# Patient Record
Sex: FEMALE | Race: OTHER | HISPANIC OR LATINO | URBAN - METROPOLITAN AREA
[De-identification: names, ages, dates, MRNs, and addresses within clinical notes are randomized per-mention and may not be internally consistent; named-entity substitution may affect disease eponyms.]

---

## 2020-10-04 ENCOUNTER — EMERGENCY (EMERGENCY)
Facility: HOSPITAL | Age: 55
LOS: 1 days | Discharge: HOME | End: 2020-10-04
Attending: EMERGENCY MEDICINE
Payer: MEDICAID

## 2020-10-04 PROCEDURE — 99284 EMERGENCY DEPT VISIT MOD MDM: CPT

## 2020-10-05 VITALS
TEMPERATURE: 98 F | OXYGEN SATURATION: 100 % | HEART RATE: 54 BPM | RESPIRATION RATE: 19 BRPM | DIASTOLIC BLOOD PRESSURE: 72 MMHG | SYSTOLIC BLOOD PRESSURE: 136 MMHG

## 2020-10-05 VITALS
DIASTOLIC BLOOD PRESSURE: 82 MMHG | RESPIRATION RATE: 18 BRPM | HEART RATE: 61 BPM | SYSTOLIC BLOOD PRESSURE: 125 MMHG | TEMPERATURE: 98 F | OXYGEN SATURATION: 99 %

## 2020-10-05 LAB
ALBUMIN SERPL ELPH-MCNC: 3.6 G/DL — SIGNIFICANT CHANGE UP (ref 3.5–5)
ALP SERPL-CCNC: 129 U/L — HIGH (ref 40–120)
ALT FLD-CCNC: 43 U/L DA — SIGNIFICANT CHANGE UP (ref 10–60)
ANION GAP SERPL CALC-SCNC: 6 MMOL/L — SIGNIFICANT CHANGE UP (ref 5–17)
APPEARANCE UR: CLEAR — SIGNIFICANT CHANGE UP
AST SERPL-CCNC: 29 U/L — SIGNIFICANT CHANGE UP (ref 10–40)
BASOPHILS # BLD AUTO: 0.02 K/UL — SIGNIFICANT CHANGE UP (ref 0–0.2)
BASOPHILS NFR BLD AUTO: 0.3 % — SIGNIFICANT CHANGE UP (ref 0–2)
BILIRUB SERPL-MCNC: 0.5 MG/DL — SIGNIFICANT CHANGE UP (ref 0.2–1.2)
BILIRUB UR-MCNC: NEGATIVE — SIGNIFICANT CHANGE UP
BUN SERPL-MCNC: 12 MG/DL — SIGNIFICANT CHANGE UP (ref 7–18)
CALCIUM SERPL-MCNC: 9 MG/DL — SIGNIFICANT CHANGE UP (ref 8.4–10.5)
CHLORIDE SERPL-SCNC: 107 MMOL/L — SIGNIFICANT CHANGE UP (ref 96–108)
CO2 SERPL-SCNC: 26 MMOL/L — SIGNIFICANT CHANGE UP (ref 22–31)
COLOR SPEC: YELLOW — SIGNIFICANT CHANGE UP
CREAT SERPL-MCNC: 0.74 MG/DL — SIGNIFICANT CHANGE UP (ref 0.5–1.3)
DIFF PNL FLD: ABNORMAL
EOSINOPHIL # BLD AUTO: 0.01 K/UL — SIGNIFICANT CHANGE UP (ref 0–0.5)
EOSINOPHIL NFR BLD AUTO: 0.2 % — SIGNIFICANT CHANGE UP (ref 0–6)
GLUCOSE SERPL-MCNC: 117 MG/DL — HIGH (ref 70–99)
GLUCOSE UR QL: NEGATIVE — SIGNIFICANT CHANGE UP
HCG SERPL-ACNC: 4 MIU/ML — SIGNIFICANT CHANGE UP
HCT VFR BLD CALC: 40.3 % — SIGNIFICANT CHANGE UP (ref 34.5–45)
HGB BLD-MCNC: 14.4 G/DL — SIGNIFICANT CHANGE UP (ref 11.5–15.5)
IMM GRANULOCYTES NFR BLD AUTO: 0.3 % — SIGNIFICANT CHANGE UP (ref 0–1.5)
KETONES UR-MCNC: ABNORMAL
LEUKOCYTE ESTERASE UR-ACNC: ABNORMAL
LIDOCAIN IGE QN: 62 U/L — LOW (ref 73–393)
LYMPHOCYTES # BLD AUTO: 0.78 K/UL — LOW (ref 1–3.3)
LYMPHOCYTES # BLD AUTO: 11.7 % — LOW (ref 13–44)
MCHC RBC-ENTMCNC: 32.8 PG — SIGNIFICANT CHANGE UP (ref 27–34)
MCHC RBC-ENTMCNC: 35.7 GM/DL — SIGNIFICANT CHANGE UP (ref 32–36)
MCV RBC AUTO: 91.8 FL — SIGNIFICANT CHANGE UP (ref 80–100)
MONOCYTES # BLD AUTO: 0.29 K/UL — SIGNIFICANT CHANGE UP (ref 0–0.9)
MONOCYTES NFR BLD AUTO: 4.4 % — SIGNIFICANT CHANGE UP (ref 2–14)
NEUTROPHILS # BLD AUTO: 5.52 K/UL — SIGNIFICANT CHANGE UP (ref 1.8–7.4)
NEUTROPHILS NFR BLD AUTO: 83.1 % — HIGH (ref 43–77)
NITRITE UR-MCNC: NEGATIVE — SIGNIFICANT CHANGE UP
NRBC # BLD: 0 /100 WBCS — SIGNIFICANT CHANGE UP (ref 0–0)
PH UR: 8 — SIGNIFICANT CHANGE UP (ref 5–8)
PLATELET # BLD AUTO: 209 K/UL — SIGNIFICANT CHANGE UP (ref 150–400)
POTASSIUM SERPL-MCNC: 3.9 MMOL/L — SIGNIFICANT CHANGE UP (ref 3.5–5.3)
POTASSIUM SERPL-SCNC: 3.9 MMOL/L — SIGNIFICANT CHANGE UP (ref 3.5–5.3)
PROT SERPL-MCNC: 7.3 G/DL — SIGNIFICANT CHANGE UP (ref 6–8.3)
PROT UR-MCNC: 15
RBC # BLD: 4.39 M/UL — SIGNIFICANT CHANGE UP (ref 3.8–5.2)
RBC # FLD: 12.1 % — SIGNIFICANT CHANGE UP (ref 10.3–14.5)
SARS-COV-2 RNA SPEC QL NAA+PROBE: SIGNIFICANT CHANGE UP
SODIUM SERPL-SCNC: 139 MMOL/L — SIGNIFICANT CHANGE UP (ref 135–145)
SP GR SPEC: 1.01 — SIGNIFICANT CHANGE UP (ref 1.01–1.02)
TROPONIN I SERPL-MCNC: <0.015 NG/ML — SIGNIFICANT CHANGE UP (ref 0–0.04)
UROBILINOGEN FLD QL: NEGATIVE — SIGNIFICANT CHANGE UP
WBC # BLD: 6.64 K/UL — SIGNIFICANT CHANGE UP (ref 3.8–10.5)
WBC # FLD AUTO: 6.64 K/UL — SIGNIFICANT CHANGE UP (ref 3.8–10.5)

## 2020-10-05 PROCEDURE — 80053 COMPREHEN METABOLIC PANEL: CPT

## 2020-10-05 PROCEDURE — 71045 X-RAY EXAM CHEST 1 VIEW: CPT

## 2020-10-05 PROCEDURE — 81001 URINALYSIS AUTO W/SCOPE: CPT

## 2020-10-05 PROCEDURE — 99284 EMERGENCY DEPT VISIT MOD MDM: CPT | Mod: 25

## 2020-10-05 PROCEDURE — 84484 ASSAY OF TROPONIN QUANT: CPT

## 2020-10-05 PROCEDURE — 84702 CHORIONIC GONADOTROPIN TEST: CPT

## 2020-10-05 PROCEDURE — 96374 THER/PROPH/DIAG INJ IV PUSH: CPT

## 2020-10-05 PROCEDURE — 36415 COLL VENOUS BLD VENIPUNCTURE: CPT

## 2020-10-05 PROCEDURE — 85025 COMPLETE CBC W/AUTO DIFF WBC: CPT

## 2020-10-05 PROCEDURE — 93005 ELECTROCARDIOGRAM TRACING: CPT

## 2020-10-05 PROCEDURE — 71045 X-RAY EXAM CHEST 1 VIEW: CPT | Mod: 26

## 2020-10-05 PROCEDURE — 87635 SARS-COV-2 COVID-19 AMP PRB: CPT

## 2020-10-05 PROCEDURE — 70450 CT HEAD/BRAIN W/O DYE: CPT | Mod: 26

## 2020-10-05 PROCEDURE — 70450 CT HEAD/BRAIN W/O DYE: CPT

## 2020-10-05 PROCEDURE — 83690 ASSAY OF LIPASE: CPT

## 2020-10-05 RX ORDER — SODIUM CHLORIDE 9 MG/ML
1000 INJECTION INTRAMUSCULAR; INTRAVENOUS; SUBCUTANEOUS ONCE
Refills: 0 | Status: COMPLETED | OUTPATIENT
Start: 2020-10-05 | End: 2020-10-05

## 2020-10-05 RX ORDER — ONDANSETRON 8 MG/1
4 TABLET, FILM COATED ORAL ONCE
Refills: 0 | Status: COMPLETED | OUTPATIENT
Start: 2020-10-05 | End: 2020-10-05

## 2020-10-05 RX ORDER — MECLIZINE HCL 12.5 MG
1 TABLET ORAL
Qty: 12 | Refills: 0
Start: 2020-10-05

## 2020-10-05 RX ADMIN — ONDANSETRON 4 MILLIGRAM(S): 8 TABLET, FILM COATED ORAL at 02:20

## 2020-10-05 RX ADMIN — SODIUM CHLORIDE 1000 MILLILITER(S): 9 INJECTION INTRAMUSCULAR; INTRAVENOUS; SUBCUTANEOUS at 02:20

## 2020-10-05 NOTE — ED PROVIDER NOTE - PATIENT PORTAL LINK FT
You can access the FollowMyHealth Patient Portal offered by [facility] by registering at the following website: [email or web address]. By joining [facility] FollowMyHealth portal, you will also be able to view your health information using other applications (apps) compatible with our system.

## 2020-10-05 NOTE — ED PROVIDER NOTE - NSFOLLOWUPCLINICS_GEN_ALL_ED_FT
[facility]  Internal Medicine  [address] [place] [address]  Phone: [phone]  Fax: [phone]  Follow Up Time:

## 2020-10-05 NOTE — ED PROVIDER NOTE - OBJECTIVE STATEMENT
Chief complaint of N/ V and dizziness described as room spinning starting at 3pm yesterday. On evaluation pt feels better after IVF and Zofran. Denies motor weakness, no LOC.   Denies recent outside [place] travels, sick contacts or known spoiled food consumption.   PMHX none  Pshx none  meds none.   Kernig and Brudzinski signs area negative, no petechiae, no photophobia, no dysarthria, no facial asymmetry, no focal deficits.  NIH stroke scale is zero. Pt passed dysphagia screen.   no ataxia, no nystagmus. no tinnitus. Normal gait. Shane henriquez pike neg (no reporducible vertigo with position changes). Chief complaint of N/V and dizziness described as room spinning starting at 3pm yesterday. On evaluation pt feels better after IVF and Zofran. Denies motor weakness, no LOC.   Denies recent outside [place] travels, sick contacts or known spoiled food consumption.   PMHX none  Pshx none  meds none.

## 2020-10-05 NOTE — ED PROVIDER NOTE - CLINICAL SUMMARY MEDICAL DECISION MAKING FREE TEXT BOX
CT reported no IC pathology. Pt feels better, no focal deficits. Pt adamantly requesting discharge.   Pt is well appearing, has no new complaints and able to walk with normal gait. Pt is stable for discharge and follow up with medical doctor. Pt educated on care and need for follow up. Discussed anticipatory guidance and return precautions. Questions answered. I had a detailed discussion with the patient and/or guardian regarding the historical points, exam findings, and any diagnostic results supporting the discharge diagnosis.

## 2020-10-05 NOTE — ED PROVIDER NOTE - CHPI ED SYMPTOMS NEG
no vomiting/no blurred vision/no confusion/no dizziness/no fever/no change in level of consciousness/no loss of consciousness/no nausea/no numbness/no weakness

## 2020-10-05 NOTE — ED PROVIDER NOTE - NSFOLLOWUPINSTRUCTIONS_ED_ALL_ED_FT
Return if dizziness worsens, weakness, vomiting, chest pain, shortness of breath any concerns.  See your doctor as soon as possible (within 1-2 days).   If you need further assistance for appointments you can contact the [facility] Care Coordinator at [phone] or the [facility] Patient Access Services helpline at [phone] to find names/contact #s for a practitioner to follow up with.  Bring your discharge papers / test results with you to any follow up appointments.   In addition our outpatient [facility] is located at [address] [place] [address], tel: [phone].

## 2020-10-05 NOTE — ED ADULT NURSE NOTE - NSIMPLEMENTINTERV_GEN_ALL_ED
Implemented All Universal Safety Interventions:  [place] to call system. Call bell, personal items and telephone within reach. Instruct patient to call for assistance. Room bathroom lighting operational. Non-slip footwear when patient is off stretcher. Physically safe environment: no spills, clutter or unnecessary equipment. Stretcher in lowest position, wheels locked, appropriate side rails in place.

## 2020-12-07 NOTE — ED PROVIDER NOTE - PHYSICAL EXAMINATION
Called and left message reminding pt to have fasting labs done. To call office with any questions. Kernig and Brudzinski signs area negative, no petechiae, no photophobia, no dysarthria, no facial asymmetry, no focal deficits.  NIH stroke scale is zero. Pt passed dysphagia screen.   no ataxia, no nystagmus. no tinnitus. Normal gait. Shane henriquez pike: mild reproducible vertigo with upright to supine.

## 2022-04-01 PROBLEM — Z00.00 ENCOUNTER FOR PREVENTIVE HEALTH EXAMINATION: Status: ACTIVE | Noted: 2022-04-01

## 2022-04-07 ENCOUNTER — APPOINTMENT (OUTPATIENT)
Dept: PLASTIC SURGERY | Facility: CLINIC | Age: 57
End: 2022-04-07
Payer: MEDICAID

## 2022-04-07 VITALS
BODY MASS INDEX: 32.44 KG/M2 | DIASTOLIC BLOOD PRESSURE: 78 MMHG | OXYGEN SATURATION: 98 % | SYSTOLIC BLOOD PRESSURE: 114 MMHG | TEMPERATURE: 97.6 F | HEART RATE: 66 BPM | WEIGHT: 190 LBS | HEIGHT: 64 IN

## 2022-04-07 DIAGNOSIS — Z78.9 OTHER SPECIFIED HEALTH STATUS: ICD-10-CM

## 2022-04-07 PROCEDURE — 99204 OFFICE O/P NEW MOD 45 MIN: CPT

## 2022-04-07 NOTE — REASON FOR VISIT
[Consultation] : a consultation visit [Family Member] : family member [FreeTextEntry1] : Dr. Estee Wheatley (General Surgery)

## 2022-04-07 NOTE — CONSULT LETTER
[Dear  ___] : Dear  [unfilled], [Consult Letter:] : I had the pleasure of evaluating your patient, [unfilled]. [Please see my note below.] : Please see my note below. [Consult Closing:] : Thank you very much for allowing me to participate in the care of this patient.  If you have any questions, please do not hesitate to contact me. [Sincerely,] : Sincerely, [FreeTextEntry3] : Jenaro Montes MD, FACS

## 2022-04-16 ENCOUNTER — APPOINTMENT (OUTPATIENT)
Dept: CT IMAGING | Facility: IMAGING CENTER | Age: 57
End: 2022-04-16
Payer: MEDICAID

## 2022-04-16 ENCOUNTER — OUTPATIENT (OUTPATIENT)
Dept: OUTPATIENT SERVICES | Facility: HOSPITAL | Age: 57
LOS: 1 days | End: 2022-04-16
Payer: MEDICAID

## 2022-04-16 DIAGNOSIS — C50.912 MALIGNANT NEOPLASM OF UNSPECIFIED SITE OF LEFT FEMALE BREAST: ICD-10-CM

## 2022-04-16 PROCEDURE — 74174 CTA ABD&PLVS W/CONTRAST: CPT | Mod: 26

## 2022-04-16 PROCEDURE — 74174 CTA ABD&PLVS W/CONTRAST: CPT

## 2022-04-16 PROCEDURE — 82565 ASSAY OF CREATININE: CPT

## 2022-05-03 ENCOUNTER — OUTPATIENT (OUTPATIENT)
Dept: OUTPATIENT SERVICES | Facility: HOSPITAL | Age: 57
LOS: 1 days | End: 2022-05-03
Payer: MEDICAID

## 2022-05-03 ENCOUNTER — RESULT REVIEW (OUTPATIENT)
Age: 57
End: 2022-05-03

## 2022-05-03 VITALS
WEIGHT: 188.72 LBS | SYSTOLIC BLOOD PRESSURE: 156 MMHG | RESPIRATION RATE: 18 BRPM | DIASTOLIC BLOOD PRESSURE: 91 MMHG | OXYGEN SATURATION: 100 % | HEART RATE: 66 BPM | HEIGHT: 62 IN | TEMPERATURE: 98 F

## 2022-05-03 DIAGNOSIS — C50.912 MALIGNANT NEOPLASM OF UNSPECIFIED SITE OF LEFT FEMALE BREAST: ICD-10-CM

## 2022-05-03 DIAGNOSIS — Z98.51 TUBAL LIGATION STATUS: Chronic | ICD-10-CM

## 2022-05-03 DIAGNOSIS — Z98.890 OTHER SPECIFIED POSTPROCEDURAL STATES: Chronic | ICD-10-CM

## 2022-05-03 DIAGNOSIS — R92.8 OTHER ABNORMAL AND INCONCLUSIVE FINDINGS ON DIAGNOSTIC IMAGING OF BREAST: ICD-10-CM

## 2022-05-03 DIAGNOSIS — C50.812 MALIGNANT NEOPLASM OF OVERLAPPING SITES OF LEFT FEMALE BREAST: ICD-10-CM

## 2022-05-03 LAB
ANION GAP SERPL CALC-SCNC: 6 MMOL/L — SIGNIFICANT CHANGE UP (ref 5–17)
BLD GP AB SCN SERPL QL: SIGNIFICANT CHANGE UP
BUN SERPL-MCNC: 13 MG/DL — SIGNIFICANT CHANGE UP (ref 7–23)
CALCIUM SERPL-MCNC: 9.5 MG/DL — SIGNIFICANT CHANGE UP (ref 8.4–10.5)
CHLORIDE SERPL-SCNC: 105 MMOL/L — SIGNIFICANT CHANGE UP (ref 96–108)
CO2 SERPL-SCNC: 30 MMOL/L — SIGNIFICANT CHANGE UP (ref 22–31)
CREAT SERPL-MCNC: 0.79 MG/DL — SIGNIFICANT CHANGE UP (ref 0.5–1.3)
EGFR: 87 ML/MIN/1.73M2 — SIGNIFICANT CHANGE UP
GLUCOSE SERPL-MCNC: 97 MG/DL — SIGNIFICANT CHANGE UP (ref 70–99)
HCG SERPL-ACNC: 4 MIU/ML — SIGNIFICANT CHANGE UP
HCT VFR BLD CALC: 42.4 % — SIGNIFICANT CHANGE UP (ref 34.5–45)
HGB BLD-MCNC: 14.6 G/DL — SIGNIFICANT CHANGE UP (ref 11.5–15.5)
MCHC RBC-ENTMCNC: 32.9 PG — SIGNIFICANT CHANGE UP (ref 27–34)
MCHC RBC-ENTMCNC: 34.4 GM/DL — SIGNIFICANT CHANGE UP (ref 32–36)
MCV RBC AUTO: 95.5 FL — SIGNIFICANT CHANGE UP (ref 80–100)
NRBC # BLD: 0 /100 WBCS — SIGNIFICANT CHANGE UP (ref 0–0)
PLATELET # BLD AUTO: 218 K/UL — SIGNIFICANT CHANGE UP (ref 150–400)
POTASSIUM SERPL-MCNC: 3.8 MMOL/L — SIGNIFICANT CHANGE UP (ref 3.5–5.3)
POTASSIUM SERPL-SCNC: 3.8 MMOL/L — SIGNIFICANT CHANGE UP (ref 3.5–5.3)
RBC # BLD: 4.44 M/UL — SIGNIFICANT CHANGE UP (ref 3.8–5.2)
RBC # FLD: 12.5 % — SIGNIFICANT CHANGE UP (ref 10.3–14.5)
SODIUM SERPL-SCNC: 141 MMOL/L — SIGNIFICANT CHANGE UP (ref 135–145)
WBC # BLD: 5.21 K/UL — SIGNIFICANT CHANGE UP (ref 3.8–10.5)
WBC # FLD AUTO: 5.21 K/UL — SIGNIFICANT CHANGE UP (ref 3.8–10.5)

## 2022-05-03 PROCEDURE — G0463: CPT

## 2022-05-03 PROCEDURE — 80048 BASIC METABOLIC PNL TOTAL CA: CPT

## 2022-05-03 PROCEDURE — 86850 RBC ANTIBODY SCREEN: CPT

## 2022-05-03 PROCEDURE — 86900 BLOOD TYPING SEROLOGIC ABO: CPT

## 2022-05-03 PROCEDURE — 85027 COMPLETE CBC AUTOMATED: CPT

## 2022-05-03 PROCEDURE — 36415 COLL VENOUS BLD VENIPUNCTURE: CPT

## 2022-05-03 PROCEDURE — 88321 CONSLTJ&REPRT SLD PREP ELSWR: CPT

## 2022-05-03 PROCEDURE — 93005 ELECTROCARDIOGRAM TRACING: CPT

## 2022-05-03 PROCEDURE — 93010 ELECTROCARDIOGRAM REPORT: CPT | Mod: NC

## 2022-05-03 PROCEDURE — 71046 X-RAY EXAM CHEST 2 VIEWS: CPT

## 2022-05-03 PROCEDURE — 86901 BLOOD TYPING SEROLOGIC RH(D): CPT

## 2022-05-03 PROCEDURE — 71046 X-RAY EXAM CHEST 2 VIEWS: CPT | Mod: 26

## 2022-05-03 PROCEDURE — 84702 CHORIONIC GONADOTROPIN TEST: CPT

## 2022-05-03 NOTE — H&P PST ADULT - NSICDXFAMILYHX_GEN_ALL_CORE_FT
FAMILY HISTORY:  Mother  Still living? Unknown  FH: liver cancer, Age at diagnosis: Age Unknown  FH: type 2 diabetes, Age at diagnosis: Age Unknown

## 2022-05-03 NOTE — H&P PST ADULT - NSANTHOSAYNRD_GEN_A_CORE
neck 14.5 inches/No. YESI screening performed.  STOP BANG Legend: 0-2 = LOW Risk; 3-4 = INTERMEDIATE Risk; 5-8 = HIGH Risk

## 2022-05-03 NOTE — H&P PST ADULT - HISTORY OF PRESENT ILLNESS
55 y/o Greek speaking female with PMH of vertigo presents for PST.  Patient reprots left breast clear nipple discharge resulting in work-up including mammogram and biopsy and dx with breast cancer.  Denies any recent cough fever or illness and is feeling well today at [facility].  Scheduled for bilateral simple mastectomy, left with sentinel node injection in OR, bilateral breast reconstruction with abdominal based flap (JIAN ) reconstruction with Dr Lezama on 05/09/2022.  COVID-19 pcr TBS- information provided

## 2022-05-03 NOTE — H&P PST ADULT - NSICDXPASTMEDICALHX_GEN_ALL_CORE_FT
PAST MEDICAL HISTORY:  History of 2019 novel coronavirus disease (COVID-19) 3/2021 - not hospitalized but was treated with Abx therapy    Malignant neoplasm of unspecified site of left female breast

## 2022-05-03 NOTE — H&P PST ADULT - NEGATIVE ENMT SYMPTOMS
no hearing difficulty/no ear pain/no nasal congestion/no nasal discharge/no nasal obstruction/no post-nasal discharge/no nose bleeds/no throat pain/no dysphagia

## 2022-05-03 NOTE — H&P PST ADULT - PROBLEM SELECTOR PLAN 1
Scheduled for bilateral simple mastectomy, left with sentinel node injection in OR, bilateral breast reconstruction with abdominal based flap (JIAN) reconstruction with Dr Lezama on 05/09/2022.  Pre op instructions given and patient verbalized understanding.  CBC, BMP, EKG and T&S pending.  NPO after midnight night before procedure.  To stop all ASA, NSAIDs, vitamins and supplements 1 week prior to procedure.  Chlorhexidine wash given with instructions.  COVID-19 testing TBS - information provided.  All instructions and information given using  phone

## 2022-05-03 NOTE — H&P PST ADULT - FALL HARM RISK - UNIVERSAL INTERVENTIONS
Bed in lowest position, wheels locked, appropriate side rails in place/Call bell, personal items and telephone in reach/Instruct patient to call for assistance before getting out of bed or chair/Non-slip footwear when patient is out of bed/[place] to call system/Physically safe environment - no spills, clutter or unnecessary equipment/Purposeful Proactive Rounding/Room/bathroom lighting operational, light cord in reach

## 2022-05-03 NOTE — H&P PST ADULT - ENDOCRINE
[Use of Plain Language] : use of plain language [Adequate] : adequate [] : I have reviewed management goals with caretaker and provided a copy of care plan [None] : none negative

## 2022-05-05 LAB — SURGICAL PATHOLOGY STUDY: SIGNIFICANT CHANGE UP

## 2022-05-07 PROBLEM — Z86.16 PERSONAL HISTORY OF COVID-19: Chronic | Status: ACTIVE | Noted: 2022-05-03

## 2022-05-08 ENCOUNTER — TRANSCRIPTION ENCOUNTER (OUTPATIENT)
Age: 57
End: 2022-05-08

## 2022-05-09 ENCOUNTER — RESULT REVIEW (OUTPATIENT)
Age: 57
End: 2022-05-09

## 2022-05-09 ENCOUNTER — APPOINTMENT (OUTPATIENT)
Dept: PLASTIC SURGERY | Facility: HOSPITAL | Age: 57
End: 2022-05-09
Payer: MEDICAID

## 2022-05-09 ENCOUNTER — TRANSCRIPTION ENCOUNTER (OUTPATIENT)
Age: 57
End: 2022-05-09

## 2022-05-09 ENCOUNTER — INPATIENT (INPATIENT)
Facility: HOSPITAL | Age: 57
LOS: 3 days | Discharge: HOME | DRG: 581 | End: 2022-05-13
Attending: SURGERY | Admitting: SURGERY
Payer: MEDICAID

## 2022-05-09 VITALS
SYSTOLIC BLOOD PRESSURE: 146 MMHG | HEIGHT: 62 IN | OXYGEN SATURATION: 99 % | RESPIRATION RATE: 14 BRPM | DIASTOLIC BLOOD PRESSURE: 89 MMHG | TEMPERATURE: 98 F | HEART RATE: 68 BPM | WEIGHT: 188.72 LBS

## 2022-05-09 DIAGNOSIS — R92.8 OTHER ABNORMAL AND INCONCLUSIVE FINDINGS ON DIAGNOSTIC IMAGING OF BREAST: ICD-10-CM

## 2022-05-09 DIAGNOSIS — Z98.51 TUBAL LIGATION STATUS: Chronic | ICD-10-CM

## 2022-05-09 DIAGNOSIS — Z98.890 OTHER SPECIFIED POSTPROCEDURAL STATES: Chronic | ICD-10-CM

## 2022-05-09 PROCEDURE — 88305 TISSUE EXAM BY PATHOLOGIST: CPT | Mod: 26

## 2022-05-09 PROCEDURE — 21600 PARTIAL REMOVAL OF RIB: CPT | Mod: 59,LT

## 2022-05-09 PROCEDURE — 88307 TISSUE EXAM BY PATHOLOGIST: CPT | Mod: 26

## 2022-05-09 PROCEDURE — S2068: CPT | Mod: LT

## 2022-05-09 PROCEDURE — 38530 BIOPSY/REMOVAL LYMPH NODES: CPT | Mod: RT

## 2022-05-09 PROCEDURE — 88333 PATH CONSLTJ SURG CYTO XM 1: CPT | Mod: 26

## 2022-05-09 PROCEDURE — 38530 BIOPSY/REMOVAL LYMPH NODES: CPT | Mod: LT

## 2022-05-09 PROCEDURE — 49999 UNLISTED PX ABD PERTM&OMN: CPT

## 2022-05-09 PROCEDURE — 49999 UNLISTED PX ABD PERTM&OMN: CPT | Mod: 62

## 2022-05-09 PROCEDURE — S2068: CPT | Mod: RT

## 2022-05-09 PROCEDURE — 19303 MAST SIMPLE COMPLETE: CPT | Mod: AS

## 2022-05-09 PROCEDURE — 21600 PARTIAL REMOVAL OF RIB: CPT | Mod: RT,59

## 2022-05-09 DEVICE — CLIP APPLIER ETHICON LIGACLIP 11.5" MEDIUM: Type: IMPLANTABLE DEVICE | Site: BILATERAL | Status: FUNCTIONAL

## 2022-05-09 DEVICE — COUPLER VESSEL MICROVASC ANAST 2MM GRN: Type: IMPLANTABLE DEVICE | Site: BILATERAL | Status: FUNCTIONAL

## 2022-05-09 DEVICE — HEMOSTAT ARISTA 1GR: Type: IMPLANTABLE DEVICE | Site: BILATERAL | Status: FUNCTIONAL

## 2022-05-09 DEVICE — COUPLER VESSEL ANASTOMOTIC 3MM: Type: IMPLANTABLE DEVICE | Site: BILATERAL | Status: FUNCTIONAL

## 2022-05-09 DEVICE — HEMOCLIP MED BLUE 6 CARTRIDGE TITANIUM: Type: IMPLANTABLE DEVICE | Site: BILATERAL | Status: FUNCTIONAL

## 2022-05-09 DEVICE — CLIP APPLIER ETHICON LIGACLIP 9 3/8" SMALL: Type: IMPLANTABLE DEVICE | Site: BILATERAL | Status: FUNCTIONAL

## 2022-05-09 DEVICE — CARTRIDGE MICROCLIP 30: Type: IMPLANTABLE DEVICE | Site: BILATERAL | Status: FUNCTIONAL

## 2022-05-09 DEVICE — HEMOCLIP SM WIDE RED 24 CARTRIDGE TITANIUM: Type: IMPLANTABLE DEVICE | Site: BILATERAL | Status: FUNCTIONAL

## 2022-05-09 DEVICE — MESH PHASIX 6X8IN: Type: IMPLANTABLE DEVICE | Site: BILATERAL | Status: FUNCTIONAL

## 2022-05-09 RX ORDER — CEFAZOLIN SODIUM 1 G
2000 VIAL (EA) INJECTION EVERY 8 HOURS
Refills: 0 | Status: COMPLETED | OUTPATIENT
Start: 2022-05-09 | End: 2022-05-10

## 2022-05-09 RX ORDER — KETOROLAC TROMETHAMINE 30 MG/ML
30 SYRINGE (ML) INJECTION EVERY 6 HOURS
Refills: 0 | Status: DISCONTINUED | OUTPATIENT
Start: 2022-05-09 | End: 2022-05-12

## 2022-05-09 RX ORDER — SODIUM CHLORIDE 9 MG/ML
1000 INJECTION, SOLUTION INTRAVENOUS
Refills: 0 | Status: DISCONTINUED | OUTPATIENT
Start: 2022-05-09 | End: 2022-05-09

## 2022-05-09 RX ORDER — DIAZEPAM 5 MG
5 TABLET ORAL EVERY 8 HOURS
Refills: 0 | Status: DISCONTINUED | OUTPATIENT
Start: 2022-05-09 | End: 2022-05-13

## 2022-05-09 RX ORDER — SENNA PLUS 8.6 MG/1
2 TABLET ORAL AT BEDTIME
Refills: 0 | Status: DISCONTINUED | OUTPATIENT
Start: 2022-05-09 | End: 2022-05-13

## 2022-05-09 RX ORDER — ACETAMINOPHEN 500 MG
1000 TABLET ORAL ONCE
Refills: 0 | Status: COMPLETED | OUTPATIENT
Start: 2022-05-09 | End: 2022-05-09

## 2022-05-09 RX ORDER — HYDROMORPHONE HYDROCHLORIDE 2 MG/ML
0.5 INJECTION INTRAMUSCULAR; INTRAVENOUS; SUBCUTANEOUS EVERY 4 HOURS
Refills: 0 | Status: DISCONTINUED | OUTPATIENT
Start: 2022-05-09 | End: 2022-05-12

## 2022-05-09 RX ORDER — HYDROMORPHONE HYDROCHLORIDE 2 MG/ML
0.5 INJECTION INTRAMUSCULAR; INTRAVENOUS; SUBCUTANEOUS
Refills: 0 | Status: DISCONTINUED | OUTPATIENT
Start: 2022-05-09 | End: 2022-05-09

## 2022-05-09 RX ORDER — SODIUM CHLORIDE 9 MG/ML
1000 INJECTION, SOLUTION INTRAVENOUS
Refills: 0 | Status: DISCONTINUED | OUTPATIENT
Start: 2022-05-09 | End: 2022-05-11

## 2022-05-09 RX ORDER — ACETAMINOPHEN 500 MG
1000 TABLET ORAL EVERY 8 HOURS
Refills: 0 | Status: COMPLETED | OUTPATIENT
Start: 2022-05-09 | End: 2022-05-11

## 2022-05-09 RX ORDER — OXYCODONE HYDROCHLORIDE 5 MG/1
5 TABLET ORAL EVERY 4 HOURS
Refills: 0 | Status: DISCONTINUED | OUTPATIENT
Start: 2022-05-09 | End: 2022-05-13

## 2022-05-09 RX ORDER — OXYCODONE HYDROCHLORIDE 5 MG/1
10 TABLET ORAL EVERY 4 HOURS
Refills: 0 | Status: DISCONTINUED | OUTPATIENT
Start: 2022-05-09 | End: 2022-05-13

## 2022-05-09 RX ORDER — MECLIZINE HCL 12.5 MG
12.5 TABLET ORAL THREE TIMES A DAY
Refills: 0 | Status: DISCONTINUED | OUTPATIENT
Start: 2022-05-09 | End: 2022-05-09

## 2022-05-09 RX ORDER — ONDANSETRON 8 MG/1
4 TABLET, FILM COATED ORAL ONCE
Refills: 0 | Status: DISCONTINUED | OUTPATIENT
Start: 2022-05-09 | End: 2022-05-09

## 2022-05-09 RX ORDER — DIPHENHYDRAMINE HCL 50 MG
25 CAPSULE ORAL EVERY 4 HOURS
Refills: 0 | Status: DISCONTINUED | OUTPATIENT
Start: 2022-05-09 | End: 2022-05-13

## 2022-05-09 RX ORDER — ENOXAPARIN SODIUM 100 MG/ML
40 INJECTION SUBCUTANEOUS EVERY 24 HOURS
Refills: 0 | Status: DISCONTINUED | OUTPATIENT
Start: 2022-05-09 | End: 2022-05-13

## 2022-05-09 RX ADMIN — SENNA PLUS 2 TABLET(S): 8.6 TABLET ORAL at 23:04

## 2022-05-09 RX ADMIN — HYDROMORPHONE HYDROCHLORIDE 0.5 MILLIGRAM(S): 2 INJECTION INTRAMUSCULAR; INTRAVENOUS; SUBCUTANEOUS at 18:50

## 2022-05-09 RX ADMIN — Medication 30 MILLIGRAM(S): at 23:34

## 2022-05-09 RX ADMIN — OXYCODONE HYDROCHLORIDE 5 MILLIGRAM(S): 5 TABLET ORAL at 21:23

## 2022-05-09 RX ADMIN — Medication 30 MILLIGRAM(S): at 23:04

## 2022-05-09 RX ADMIN — Medication 1000 MILLIGRAM(S): at 18:07

## 2022-05-09 RX ADMIN — Medication 30 MILLIGRAM(S): at 18:04

## 2022-05-09 RX ADMIN — SODIUM CHLORIDE 50 MILLILITER(S): 9 INJECTION, SOLUTION INTRAVENOUS at 06:39

## 2022-05-09 RX ADMIN — Medication 100 MILLIGRAM(S): at 23:04

## 2022-05-09 RX ADMIN — SODIUM CHLORIDE 75 MILLILITER(S): 9 INJECTION, SOLUTION INTRAVENOUS at 21:23

## 2022-05-09 RX ADMIN — Medication 400 MILLIGRAM(S): at 17:45

## 2022-05-09 RX ADMIN — Medication 30 MILLIGRAM(S): at 18:49

## 2022-05-09 RX ADMIN — OXYCODONE HYDROCHLORIDE 5 MILLIGRAM(S): 5 TABLET ORAL at 21:53

## 2022-05-09 NOTE — BRIEF OPERATIVE NOTE - SPECIMENS
see Dr. Wheatley (breast sx) see Dr. Wheatley (breast sx), right & left internal mammary lymph nodes.

## 2022-05-09 NOTE — CONSULT NOTE ADULT - SUBJECTIVE AND OBJECTIVE BOX
HPI: 57 y/o F w/ pmh of vertigo, recently dx w/ breast ca now s/p b/l simple mastectomy, L. w/ sentinel node injection in OR, b/l breast reconstruction w/ abdominal JIAN flap. MICU consulted for admission for q1h JIAN flap monitor. Pt now POD#0, seen and examined at bedside currently comfortable and w/out any medical complains.    Review of Systems:  Constitutional: No fever, chills, fatigue  Neuro: No headache, numbness, weakness  Resp: No cough, wheezing, shortness of breath  CVS: No chest pain, palpitations, leg swelling  GI: No abdominal pain, nausea, vomiting, diarrhea   : No dysuria, frequency, incontinence  Skin: No itching, burning, rashes, or lesions   Msk: No joint pain or swelling  Psych: No depression, anxiety, mood swings    T(F): 97 (05-09-22 @ 19:06), Max: 97.6 (05-09-22 @ 19:00)  HR: 72 (05-09-22 @ 21:00) (62 - 86)  BP: 120/73 (05-09-22 @ 21:00) (115/73 - 146/89)  RR: 15 (05-09-22 @ 21:00) (12 - 16)  SpO2: 99% (05-09-22 @ 21:00) (97% - 100%)  Wt(kg): --      05-09-22 @ 07:01  -  05-09-22 @ 21:08  --------------------------------------------------------  IN: 375 mL / OUT: 431 mL / NET: -56 mL        CAPILLARY BLOOD GLUCOSE          I&O's Summary    09 May 2022 07:01  -  09 May 2022 21:08  --------------------------------------------------------  IN: 375 mL / OUT: 431 mL / NET: -56 mL        Physical Exam:   Gen: Comfortable in bed in NAD  Neuro: AAOx3  HEENT: PERRL  Resp: CTA b/l  CVS: +RRR  Chest: b/l breast w/ vioptix sensors in place, incision site c/d/i, flaps w/out any signs of ischemia, no signs of discoloration/hematoma, R. breast vioptix 58% w/ SQ of 95 and L. breast 70% w/ SQ of 96%  Abd: incision site c/d/i, abd soft, ND  Ext: no edema   Skin: warm/dry    Meds:  ceFAZolin   IVPB IV Intermittent          acetaminophen   IVPB .. IV Intermittent  diazepam    Tablet Oral PRN  diphenhydrAMINE Oral PRN  HYDROmorphone  Injectable IV Push PRN  ketorolac   Injectable IV Push  oxyCODONE    IR Oral PRN  oxyCODONE    IR Oral PRN      enoxaparin Injectable SubCutaneous    senna Oral      lactated ringers. IV Continuous      CENTRAL LINE: N  MOORE: Y  A-LINE: N    CODE STATUS: FULL CODE

## 2022-05-09 NOTE — BRIEF OPERATIVE NOTE - BRIEF OP NOTE DRAINS
6 in total:  4 15 blakes ( 2 in each breast)  2 19 blakes ( 2 in abdomen)
yes refer to Dr. Montes's op note

## 2022-05-09 NOTE — CONSULT NOTE ADULT - ASSESSMENT
55 y/o F w/ pmh of vertigo, recently dx w/ breast ca now s/p b/l simple mastectomy, L. w/ sentinel node injection in OR, b/l breast reconstruction w/ abdominal JIAN flap. MICU consulted for admission for q1h JIAN flap monitor. Pt now POD#0, seen and examined at bedside currently comfortable and w/out any medical complains.        -Neuro: No acute issues pain control w tylenol/toradol and oxycodone/dilaudid per pain scale (hold for sedation)  -Cardiac: HDS no acute issues, maintain MAP >65  -Resp: No acute issues, maintain o2 >92%  -GI: NPO for now, advance diet as tolerated  -Renal: renal function stable, maintain rdz tonight and monitor UOP, cont LR at 75cc/hr  -ID: Complete ppx courser of ancef  -Endo: no acute issues  -Heme: DVT ppx w/ lovenox  -Surgical: Breast ca now s/p b/l mastectomy w/ breast reconstruction and abdominal JIAN flap plan to monitor vioptix tonight closely, will notify surgical team for drops in vioptix >10 percent  -Dispo: Admit to Surgical unit under MICU service, pt is full code, dispo pending hospital course, plan d/w bedside RN and pt.

## 2022-05-09 NOTE — BRIEF OPERATIVE NOTE - NSICDXBRIEFPREOP_GEN_ALL_CORE_FT
PRE-OP DIAGNOSIS:  Breast cancer, left 09-May-2022 07:01:05  Rigo De La Vega  
PRE-OP DIAGNOSIS:  Breast cancer, left 09-May-2022 07:01:05  Rigo De La Vega

## 2022-05-09 NOTE — BRIEF OPERATIVE NOTE - NSICDXBRIEFPROCEDURE_GEN_ALL_CORE_FT
PROCEDURES:  Mastectomy, bilateral, with sentinel node biopsy 09-May-2022 11:27:35  Elissa Molina A

## 2022-05-09 NOTE — PATIENT PROFILE ADULT - NSPROPTRIGHTCAREGIVER_GEN_A_NUR
IRINEO Note: -S/O: The above pt has been out in the day area the a.am. Portion of the shift hyper-verbal, flight of ideas, responding to internal stimuli, mumbling in different voice tones, and being disruptive while out in the milieu the entire morning. She has been yelling racial remarks at staff she has verbalized \"You are just a nigga\", throwing water at the staff and also being very argument ike towards staff when was encouraged to take her medicine during this shift. She has been singing, rapping, and attempting to take her clothing off while out in the day sunita. She was re-directed for her behavior by staff. She also has been focused on hyper-sexual behavior while out in the day area and in her room this a.am. Portion of the shift. She was given some medication complaint (see nurse note) during this shift. She consumed her breakfast she eating her breakfast very childlike manner which consist of playing with her food eating with her hands and drinking the syrup and while out in the day area. She is currently resting in her bed at this time. She refused group during this shift. The above pt woke up and proceeded out in the day area she consumed her lunch during this shift. She appeared calm and cooperative and she was complaint with her medications during the afternoon portion of the shift. She participated in group this afternoon while group was in session she still appear to be responding to internal stimuli and laughing, mumbling and making facial gestures while in group this shift. She has not experienced any falls during this shift. She appear to be focused on men when out in the day area she has required re-direction for singing and dancing in front of the the men on this shift. She verbally contract for safety and denies any self-harm at this time. -A-Problem #1 cont. -P-Maintain a safe and supportive environment. yes

## 2022-05-09 NOTE — BRIEF OPERATIVE NOTE - IV INFUSIONS - CRYSTALLOIDS
ADVANCE PRACTICE EXAM & DAILY COMMUNICATION NOTE    Patient Active Problem List   Diagnosis     Premature infant     Malnutrition (H)      abstinence symptoms       VITALS:  Temp:  [98.2  F (36.8  C)-99  F (37.2  C)] 98.8  F (37.1  C)  Heart Rate:  [122-144] 128  Resp:  [34-55] 55  BP: (69-91)/(49-61) 91/53  Cuff Mean (mmHg):  [54-68] 65      PHYSICAL EXAM:  Constitutional: alert, crying, disorganized movements. HOB flat.  Head: Normocephalic. Anterior fontanelle soft, scalp clear.   Cardiovascular: Regular rate and rhythm.  No murmur.  Normal S1 & S2.  Peripheral/femoral pulses present, normal and symmetric. Extremities warm. Capillary refill <3 seconds peripherally and centrally.    Respiratory: Breath sounds clear with good aeration bilaterally.  No retractions or nasal flaring.   Gastrointestinal: Soft, non-tender, non-distended.  No masses or hepatomegaly. Bowel sounds present.  Musculoskeletal: extremities normal- no gross deformities noted, normal muscle tone  Skin: No jaundice. Buttocks rash healing.Scratches noted on face from infant's fingernails.   Neurologic:  No focal deficits.       PARENT COMMUNICATION: Dad updated by phone after rounds.     JAZZY Wong, CNP 2017  4:40 PM  [facility]       LR

## 2022-05-09 NOTE — BRIEF OPERATIVE NOTE - OPERATION/FINDINGS
see operative report
3.0/2.0  on the left  3.0/2.0  on the right    6x8 phasix mesh placed in abdomen  60 cc's of exparel used.

## 2022-05-09 NOTE — BRIEF OPERATIVE NOTE - NSICDXBRIEFPOSTOP_GEN_ALL_CORE_FT
POST-OP DIAGNOSIS:  Breast cancer, left 09-May-2022 07:01:16  Rigo De La Vega  
POST-OP DIAGNOSIS:  Breast cancer, left 09-May-2022 07:01:16  Rigo De La Vega

## 2022-05-10 LAB
ALBUMIN SERPL ELPH-MCNC: 3 G/DL — LOW (ref 3.3–5)
ALP SERPL-CCNC: 104 U/L — SIGNIFICANT CHANGE UP (ref 40–120)
ALT FLD-CCNC: 37 U/L — SIGNIFICANT CHANGE UP (ref 10–45)
ANION GAP SERPL CALC-SCNC: 8 MMOL/L — SIGNIFICANT CHANGE UP (ref 5–17)
AST SERPL-CCNC: 57 U/L — HIGH (ref 10–40)
BASOPHILS # BLD AUTO: 0.04 K/UL — SIGNIFICANT CHANGE UP (ref 0–0.2)
BASOPHILS NFR BLD AUTO: 0.3 % — SIGNIFICANT CHANGE UP (ref 0–2)
BILIRUB SERPL-MCNC: 1 MG/DL — SIGNIFICANT CHANGE UP (ref 0.2–1.2)
BUN SERPL-MCNC: 14 MG/DL — SIGNIFICANT CHANGE UP (ref 7–23)
CALCIUM SERPL-MCNC: 9.2 MG/DL — SIGNIFICANT CHANGE UP (ref 8.4–10.5)
CHLORIDE SERPL-SCNC: 105 MMOL/L — SIGNIFICANT CHANGE UP (ref 96–108)
CO2 SERPL-SCNC: 25 MMOL/L — SIGNIFICANT CHANGE UP (ref 22–31)
CREAT SERPL-MCNC: 0.92 MG/DL — SIGNIFICANT CHANGE UP (ref 0.5–1.3)
EGFR: 72 ML/MIN/1.73M2 — SIGNIFICANT CHANGE UP
EOSINOPHIL # BLD AUTO: 0 K/UL — SIGNIFICANT CHANGE UP (ref 0–0.5)
EOSINOPHIL NFR BLD AUTO: 0 % — SIGNIFICANT CHANGE UP (ref 0–6)
GLUCOSE SERPL-MCNC: 139 MG/DL — HIGH (ref 70–99)
HCT VFR BLD CALC: 39.6 % — SIGNIFICANT CHANGE UP (ref 34.5–45)
HGB BLD-MCNC: 13.3 G/DL — SIGNIFICANT CHANGE UP (ref 11.5–15.5)
IMM GRANULOCYTES NFR BLD AUTO: 0.6 % — SIGNIFICANT CHANGE UP (ref 0–1.5)
LYMPHOCYTES # BLD AUTO: 1.01 K/UL — SIGNIFICANT CHANGE UP (ref 1–3.3)
LYMPHOCYTES # BLD AUTO: 7.1 % — LOW (ref 13–44)
MAGNESIUM SERPL-MCNC: 1.9 MG/DL — SIGNIFICANT CHANGE UP (ref 1.6–2.6)
MCHC RBC-ENTMCNC: 32.7 PG — SIGNIFICANT CHANGE UP (ref 27–34)
MCHC RBC-ENTMCNC: 33.6 GM/DL — SIGNIFICANT CHANGE UP (ref 32–36)
MCV RBC AUTO: 97.3 FL — SIGNIFICANT CHANGE UP (ref 80–100)
MONOCYTES # BLD AUTO: 1.29 K/UL — HIGH (ref 0–0.9)
MONOCYTES NFR BLD AUTO: 9 % — SIGNIFICANT CHANGE UP (ref 2–14)
NEUTROPHILS # BLD AUTO: 11.89 K/UL — HIGH (ref 1.8–7.4)
NEUTROPHILS NFR BLD AUTO: 83 % — HIGH (ref 43–77)
NRBC # BLD: 0 /100 WBCS — SIGNIFICANT CHANGE UP (ref 0–0)
PHOSPHATE SERPL-MCNC: 3.7 MG/DL — SIGNIFICANT CHANGE UP (ref 2.5–4.5)
PLATELET # BLD AUTO: 187 K/UL — SIGNIFICANT CHANGE UP (ref 150–400)
POTASSIUM SERPL-MCNC: 4.6 MMOL/L — SIGNIFICANT CHANGE UP (ref 3.5–5.3)
POTASSIUM SERPL-SCNC: 4.6 MMOL/L — SIGNIFICANT CHANGE UP (ref 3.5–5.3)
PROT SERPL-MCNC: 6.3 G/DL — SIGNIFICANT CHANGE UP (ref 6–8.3)
RBC # BLD: 4.07 M/UL — SIGNIFICANT CHANGE UP (ref 3.8–5.2)
RBC # FLD: 12.8 % — SIGNIFICANT CHANGE UP (ref 10.3–14.5)
SODIUM SERPL-SCNC: 138 MMOL/L — SIGNIFICANT CHANGE UP (ref 135–145)
WBC # BLD: 14.31 K/UL — HIGH (ref 3.8–10.5)
WBC # FLD AUTO: 14.31 K/UL — HIGH (ref 3.8–10.5)

## 2022-05-10 RX ORDER — SODIUM CHLORIDE 9 MG/ML
500 INJECTION INTRAMUSCULAR; INTRAVENOUS; SUBCUTANEOUS ONCE
Refills: 0 | Status: COMPLETED | OUTPATIENT
Start: 2022-05-10 | End: 2022-05-10

## 2022-05-10 RX ADMIN — SODIUM CHLORIDE 1000 MILLILITER(S): 9 INJECTION INTRAMUSCULAR; INTRAVENOUS; SUBCUTANEOUS at 16:35

## 2022-05-10 RX ADMIN — Medication 1000 MILLIGRAM(S): at 12:28

## 2022-05-10 RX ADMIN — OXYCODONE HYDROCHLORIDE 10 MILLIGRAM(S): 5 TABLET ORAL at 22:42

## 2022-05-10 RX ADMIN — Medication 400 MILLIGRAM(S): at 20:20

## 2022-05-10 RX ADMIN — OXYCODONE HYDROCHLORIDE 10 MILLIGRAM(S): 5 TABLET ORAL at 03:37

## 2022-05-10 RX ADMIN — Medication 100 MILLIGRAM(S): at 08:25

## 2022-05-10 RX ADMIN — SENNA PLUS 2 TABLET(S): 8.6 TABLET ORAL at 21:58

## 2022-05-10 RX ADMIN — Medication 30 MILLIGRAM(S): at 19:35

## 2022-05-10 RX ADMIN — Medication 30 MILLIGRAM(S): at 06:04

## 2022-05-10 RX ADMIN — OXYCODONE HYDROCHLORIDE 10 MILLIGRAM(S): 5 TABLET ORAL at 08:19

## 2022-05-10 RX ADMIN — OXYCODONE HYDROCHLORIDE 10 MILLIGRAM(S): 5 TABLET ORAL at 23:42

## 2022-05-10 RX ADMIN — Medication 1000 MILLIGRAM(S): at 20:50

## 2022-05-10 RX ADMIN — Medication 30 MILLIGRAM(S): at 18:38

## 2022-05-10 RX ADMIN — Medication 400 MILLIGRAM(S): at 01:08

## 2022-05-10 RX ADMIN — Medication 400 MILLIGRAM(S): at 12:03

## 2022-05-10 RX ADMIN — OXYCODONE HYDROCHLORIDE 10 MILLIGRAM(S): 5 TABLET ORAL at 04:07

## 2022-05-10 RX ADMIN — Medication 1000 MILLIGRAM(S): at 01:38

## 2022-05-10 RX ADMIN — ENOXAPARIN SODIUM 40 MILLIGRAM(S): 100 INJECTION SUBCUTANEOUS at 07:08

## 2022-05-10 NOTE — PROGRESS NOTE ADULT - ASSESSMENT
POD #1 bilateral mastectomy    -continue vioptic/flap checks  -oob to chair   -pain controlled  -dvt prophylaxis  -IV bolus for low BP   -encourage po intake  -support bra and binder place on day of discharge  -monitor h/h

## 2022-05-10 NOTE — PROGRESS NOTE ADULT - NS PANP COMMENT GEN_ALL_CORE FT
patient seen and examined   comfortable  no cp, sob, palp  pain control  care as per surgical team   transition care from icu to surgical team today

## 2022-05-10 NOTE — PROGRESS NOTE ADULT - SUBJECTIVE AND OBJECTIVE BOX
No significant events overnight  C/o back pain  Surgical sites pain under control  Denies CP or SOB    ICU Vital Signs Last 24 Hrs  T(C): 36.9 (09 May 2022 23:00), Max: 36.9 (09 May 2022 23:00)  T(F): 98.5 (09 May 2022 23:00), Max: 98.5 (09 May 2022 23:00)  HR: 74 (10 May 2022 06:00) (62 - 86)  BP: 113/70 (10 May 2022 06:00) (103/64 - 141/86)  BP(mean): 82 (10 May 2022 06:00) (78 - 98)  ABP: --  ABP(mean): --  RR: 15 (10 May 2022 06:00) (12 - 16)  SpO2: 93% (10 May 2022 06:00) (93% - 100%)      Breast - flaps are warm, pink, no congestion, Vioptix stable, sarah serosang, soft  ABd -incision cdi, sarah serosang

## 2022-05-10 NOTE — PROGRESS NOTE ADULT - ASSESSMENT
Plan -POD#1 s/p bilateral JIAN flap reconstruction    -Cont flap check/vioptix  -Cont Toradol IV q 6hrs  -OOB  -Regular diet  -NO caffeine  -DVT prophylaxis  - -VNS

## 2022-05-10 NOTE — PROGRESS NOTE ADULT - ASSESSMENT
PLAN:    -admit to ICU  -Q1H Vioptix tissue oxygenation monitoring. Baseline vioptix as noted in HPI.  If VIOPTIX noted to drop by 20%  or more in a 30 minute time frame will alert primary surgical team  -ensure vioptix signal strength >80%  -serial flap assessment for signs of perfusion  -skin assessment for color, firmness, signs of hematoma or other changes  -abdominal incisions site  monitoring  -hourly checks on JUVENTINO drain output  -pain control  -Morning LABS  -have discussed case with eICU team, including attending physician  -any and all changes or concerns regarding JIAN procedure, flap, etc will be immedietly addressed with primary surgical team

## 2022-05-10 NOTE — PROGRESS NOTE ADULT - SUBJECTIVE AND OBJECTIVE BOX
HPI:    56y Female PMH of Breast CA, now POD#__1___ from b/l Mastectomy, with JIAN/free flap placement, as well as VIOPTIX tissue/flap oxygenation monitoring.     Current Vioptix:    Right Breast: 57%  Left Breast: 68%    Current Signal Strength    Right Breast: 96  Left Breast: 94            PAST MEDICAL & SURGICAL HISTORY:  Malignant neoplasm of unspecified site of left female breast      History of 2019 novel coronavirus disease (COVID-19)  3/2021 - not hospitalized but was treated with Abx therapy      H/O tubal ligation  1990      H/O colonoscopy          MEDICATIONS  (STANDING):  acetaminophen   IVPB .. 1000 milliGRAM(s) IV Intermittent every 8 hours  enoxaparin Injectable 40 milliGRAM(s) SubCutaneous every 24 hours  ketorolac   Injectable 30 milliGRAM(s) IV Push every 6 hours  lactated ringers. 1000 milliLiter(s) (100 mL/Hr) IV Continuous <Continuous>  senna 2 Tablet(s) Oral at bedtime    MEDICATIONS  (PRN):  diazepam    Tablet 5 milliGRAM(s) Oral every 8 hours PRN Muscle spasm  diphenhydrAMINE 25 milliGRAM(s) Oral every 4 hours PRN Itching  HYDROmorphone  Injectable 0.5 milliGRAM(s) IV Push every 4 hours PRN Severe Pain (7 - 10)  oxyCODONE    IR 5 milliGRAM(s) Oral every 4 hours PRN Moderate Pain (4 - 6)  oxyCODONE    IR 10 milliGRAM(s) Oral every 4 hours PRN Severe Pain (7 - 10)      Review of Systems:  CONSTITUTIONAL: No fever, chills, or fatigue  EYES: No eye pain, visual disturbances, or discharge  ENMT:  No difficulty hearing, tinnitus, vertigo; No sinus or throat pain  NECK: No pain or stiffness  RESPIRATORY: No cough, wheezing, chills or hemoptysis; No shortness of breath  CARDIOVASCULAR: No chest pain, palpitations, dizziness, or leg swelling  GASTROINTESTINAL: No abdominal or epigastric pain. No nausea, vomiting, or hematemesis; No diarrhea or constipation. No melena or hematochezia.  GENITOURINARY: No dysuria, frequency, hematuria, or incontinence  NEUROLOGICAL: No headaches, memory loss, loss of strength, numbness, or tremors  SKIN: No itching, burning, rashes, or lesions   MUSCULOSKELETAL: No joint pain or swelling; No muscle, back, or extremity pain  PSYCHIATRIC: No depression, anxiety, mood swings, or difficulty sleeping      ICU Vital Signs Last 24 Hrs  T(C): 36.9 (10 May 2022 08:33), Max: 36.9 (09 May 2022 23:00)  T(F): 98.4 (10 May 2022 08:33), Max: 98.5 (09 May 2022 23:00)  HR: 74 (10 May 2022 12:55) (64 - 75)  BP: 115/68 (10 May 2022 12:55) (101/62 - 119/73)  BP(mean): 82 (10 May 2022 06:00) (78 - 98)  RR: 14 (10 May 2022 12:55) (12 - 15)  SpO2: 94% (10 May 2022 12:55) (93% - 99%)                              13.3   14.31 )-----------( 187      ( 10 May 2022 06:19 )             39.6       05-10    138  |  105  |  14  ----------------------------<  139<H>  4.6   |  25  |  0.92    Ca    9.2      10 May 2022 06:19  Phos  3.7     05-10  Mg     1.9     05-10    TPro  6.3  /  Alb  3.0<L>  /  TBili  1.0  /  DBili  x   /  AST  57<H>  /  ALT  37  /  AlkPhos  104  05-10          Physical Examination:    General: No acute distress.  Alert, oriented, interactive, nonfocal    BREAST: breasts appears symmetrical, no signs of hematoma, soft to palpation, non tender, well perfused, turgor maintained,cap refill adequate. Viotpix in place functioning well. JUVENTINO drains in place with serosanguinous drainage.     HEENT: Pupils equal, reactive to light.  Symmetric.    PULM: Clear to auscultation bilaterally, no significant sputum production    CVS: Regular rate and rhythm, no murmurs, rubs, or gallops    ABD: Soft, nondistended, nontender, normoactive bowel sounds, no masses. Flap removal site well approximated, staples in palce. No ative drainage, bleeding, or signs of infection.    EXT: No edema, nontender    SKIN: Warm and well perfused, no rashes noted.

## 2022-05-10 NOTE — PROGRESS NOTE ADULT - ASSESSMENT
ASSESSMENT:  1. JIAN      PLAN:  - Vioptix monitoring in place, alert if change in 10% in less than 1 hour  - Monitor JUVENTINO drain output  - Pain control with Tylenol, Toradol, Oxycodone, Morphine  - Supplement O2 as needed, wean as tolerated  - Encourage incentive spirometry use routinely  - Advance diet as tolerated  - Surgical/primary team to be alerted to any change in patient condition  - TOV, rdz removal  - OOB to chair as tolerated  - PPX: Lovenox  - GOC: Full CODE     ASSESSMENT:  1. JIAN h/o breast cancer      PLAN:  - Vioptix monitoring in place, alert if change in 10% in less than 1 hour  - Monitor JUVENTINO drain output  - Pain control with Tylenol, Toradol, Oxycodone, Morphine  - Supplement O2 as needed, wean as tolerated  - Encourage incentive spirometry use routinely  - Advance diet as tolerated  - Surgical/primary team to be alerted to any change in patient condition  - TOV, rdz removal  - OOB to chair as tolerated  - PPX: Lovenox  - GOC: Full CODE

## 2022-05-10 NOTE — PROGRESS NOTE ADULT - SUBJECTIVE AND OBJECTIVE BOX
POD#1 bilat mastectomy with JIAN flaps      Pt resting comfortably no new complaints. no acute events overnight. pain controlled. pt seen by Dr Montes this am.    Vital Signs Last 24 Hrs  T(C): 36.9 (10 May 2022 08:33), Max: 36.9 (09 May 2022 23:00)  T(F): 98.4 (10 May 2022 08:33), Max: 98.5 (09 May 2022 23:00)  HR: 74 (10 May 2022 12:55) (62 - 86)  BP: 115/68 (10 May 2022 12:55) (101/62 - 141/86)  BP(mean): 82 (10 May 2022 06:00) (78 - 98)  RR: 14 (10 May 2022 12:55) (12 - 16)  SpO2: 94% (10 May 2022 12:55) (93% - 100%)      PE: Breast - flaps are warm, pink, no congestion,  Vioptix stable, no signs of hematoma forming sarah drains in place  ABd - soft nt/nd incision cdi, sarah drains in place.                         13.3   14.31 )-----------( 187      ( 10 May 2022 06:19 )             39.6   05-10    138  |  105  |  14  ----------------------------<  139<H>  4.6   |  25  |  0.92    Ca    9.2      10 May 2022 06:19  Phos  3.7     05-10  Mg     1.9     05-10    TPro  6.3  /  Alb  3.0<L>  /  TBili  1.0  /  DBili  x   /  AST  57<H>  /  ALT  37  /  AlkPhos  104  05-10       POD#1 bilat mastectomy with JIAN flaps      Pt resting comfortably no new complaints. no acute events overnight. pain controlled. pt seen by Dr Montes this am.    Vital Signs Last 24 Hrs  T(C): 36.9 (10 May 2022 08:33), Max: 36.9 (09 May 2022 23:00)  T(F): 98.4 (10 May 2022 08:33), Max: 98.5 (09 May 2022 23:00)  HR: 74 (10 May 2022 12:55) (62 - 86)  BP: 115/68 (10 May 2022 12:55) (101/62 - 141/86)  BP(mean): 82 (10 May 2022 06:00) (78 - 98)  RR: 14 (10 May 2022 12:55) (12 - 16)  SpO2: 94% (10 May 2022 12:55) (93% - 100%)      PE: Breast - flaps are warm, pink, no congestion,  Vioptix stable  Right - 64% 97  Left- 71% 95   no signs of hematoma forming sarah drains in place  ABd - soft nt/nd incision cdi, sarah drains in place.                         13.3   14.31 )-----------( 187      ( 10 May 2022 06:19 )             39.6   05-10    138  |  105  |  14  ----------------------------<  139<H>  4.6   |  25  |  0.92    Ca    9.2      10 May 2022 06:19  Phos  3.7     05-10  Mg     1.9     05-10    TPro  6.3  /  Alb  3.0<L>  /  TBili  1.0  /  DBili  x   /  AST  57<H>  /  ALT  37  /  AlkPhos  104  05-10

## 2022-05-10 NOTE — PROGRESS NOTE ADULT - SUBJECTIVE AND OBJECTIVE BOX
CC: Patient is a 56y old  Female who presents with a chief complaint of s/p JIAN (09 May 2022 21:05)      S: Patient with episode of borderline hypoxia while sleeping overnight, no further event. Patient remains on supplemental O2 via NC. Reports pain however controlled with pain medications.    Right - 60% 97  Left- 71% 95  Patient seen and examined at bedside.    ALLERGIES:  contrast dye (Nausea)  No Known Drug Allergies      MEDICATIONS:        Vital Signs Last 24 Hrs  T(F): 98.4 (10 May 2022 08:33), Max: 98.5 (09 May 2022 23:00)  HR: 69 (10 May 2022 08:33) (62 - 86)  BP: 101/62 (10 May 2022 08:33) (101/62 - 141/86)  RR: 14 (10 May 2022 08:33) (12 - 16)  SpO2: 97% (10 May 2022 08:33) (93% - 100%)  I&O's Summary    09 May 2022 07:01  -  10 May 2022 07:00  --------------------------------------------------------  IN: 1125 mL / OUT: 1085 mL / NET: 40 mL        PHYSICAL EXAM:  General: NAD  ENT: MMM  Neck: Supple, No JVD  Lungs: Clear to auscultation bilaterally  Cardio: RRR, S1/S2, No murmurs  Chest: Flaps soft, well perfused. Vioptix monitors in place. 1 drain about each breath offering serosanguinous drainage. Wound with signs of dehiscence or infection  Abdomen: 1 drain about each side of abdomen. Soft, Nontender, Nondistended; Bowel sounds present  Extremities: No cyanosis, No edema  Neuro: no new deficits  Skin: no rashes  Psych: AAO    LABS:                        13.3   14.31 )-----------( 187      ( 10 May 2022 06:19 )             39.6     05-10    138  |  105  |  14  ----------------------------<  139  4.6   |  25  |  0.92    Ca    9.2      10 May 2022 06:19  Phos  3.7     05-10  Mg     1.9     05-10    TPro  6.3  /  Alb  3.0  /  TBili  1.0  /  DBili  x   /  AST  57  /  ALT  37  /  AlkPhos  104  05-10                                        RADIOLOGY & ADDITIONAL TESTS:    Care Discussed with Consultants/Other Providers:                CC: Patient is a 56y old  Female who presents with a chief complaint of s/p JIAN (09 May 2022 21:05)    HPI  55 y/o Hungarian speaking female with PMH of vertigo presents for PST.  Patient reprots left breast clear nipple discharge resulting in work-up including mammogram and biopsy and dx with breast cancer.  Denies any recent cough fever or illness and is feeling well today at [facility].  Scheduled for bilateral simple mastectomy, left with sentinel node injection in OR, bilateral breast reconstruction with abdominal based flap (JIAN ) reconstruction with Dr Lezama on 05/09/2022.  S/p JIAN on 5/9/22        S: Patient with episode of borderline hypoxia while sleeping overnight, no further event. Patient remains on supplemental O2 via NC. Reports pain however controlled with pain medications.  patient complaining of pain.   no cp, palp, n/v    Right - 60% 97  Left- 71% 95  Patient seen and examined at bedside.    ALLERGIES:  contrast dye (Nausea)  No Known Drug Allergies        MEDICATIONS  (STANDING):  acetaminophen   IVPB .. 1000 milliGRAM(s) IV Intermittent every 8 hours  enoxaparin Injectable 40 milliGRAM(s) SubCutaneous every 24 hours  ketorolac   Injectable 30 milliGRAM(s) IV Push every 6 hours  lactated ringers. 1000 milliLiter(s) (100 mL/Hr) IV Continuous <Continuous>  senna 2 Tablet(s) Oral at bedtime    MEDICATIONS  (PRN):  diazepam    Tablet 5 milliGRAM(s) Oral every 8 hours PRN Muscle spasm  diphenhydrAMINE 25 milliGRAM(s) Oral every 4 hours PRN Itching  HYDROmorphone  Injectable 0.5 milliGRAM(s) IV Push every 4 hours PRN Severe Pain (7 - 10)  oxyCODONE    IR 5 milliGRAM(s) Oral every 4 hours PRN Moderate Pain (4 - 6)  oxyCODONE    IR 10 milliGRAM(s) Oral every 4 hours PRN Severe Pain (7 - 10)    PAST MEDICAL & SURGICAL HISTORY:  Malignant neoplasm of unspecified site of left female breast      History of 2019 novel coronavirus disease (COVID-19)  3/2021 - not hospitalized but was treated with Abx therapy      H/O tubal ligation  1990      H/O colonoscopy    Social history no smoking drinking, drug use  FAMILY HISTORY:  FH: type 2 diabetes (Mother)    FH: liver cancer (Mother)      Allergies    contrast dye (Nausea)  No Known Drug Allergies        Vital Signs Last 24 Hrs  T(F): 98.4 (10 May 2022 08:33), Max: 98.5 (09 May 2022 23:00)    HR: 69 (10 May 2022 08:33) (62 - 86)  BP: 101/62 (10 May 2022 08:33) (101/62 - 141/86)  RR: 14 (10 May 2022 08:33) (12 - 16)  SpO2: 97% (10 May 2022 08:33) (93% - 100%)  I&O's Summary  Social History:    09 May 2022 07:01  -  10 May 2022 07:00  --------------------------------------------------------  IN: 1125 mL / OUT: 1085 mL / NET: 40 mL        PHYSICAL EXAM:  General: NAD  ENT: MMM  Neck: Supple, No JVD  Lungs: Clear to auscultation bilaterally  Cardio: RRR, S1/S2, No murmurs  Chest: Flaps soft, well perfused. Vioptix monitors in place. 1 drain about each breath offering serosanguinous drainage. Wound with signs of dehiscence or infection  Abdomen: 1 drain about each side of abdomen. Soft, Nontender, Nondistended; Bowel sounds present  Extremities: No cyanosis, No edema  Neuro: no new deficits  Skin: no rashes  Psych: AAOx3    LABS:                        13.3   14.31 )-----------( 187      ( 10 May 2022 06:19 )             39.6     05-10    138  |  105  |  14  ----------------------------<  139  4.6   |  25  |  0.92    Ca    9.2      10 May 2022 06:19  Phos  3.7     05-10  Mg     1.9     05-10    TPro  6.3  /  Alb  3.0  /  TBili  1.0  /  DBili  x   /  AST  57  /  ALT  37  /  AlkPhos  104  05-10

## 2022-05-11 LAB
ANION GAP SERPL CALC-SCNC: 6 MMOL/L — SIGNIFICANT CHANGE UP (ref 5–17)
BUN SERPL-MCNC: 20 MG/DL — SIGNIFICANT CHANGE UP (ref 7–23)
CALCIUM SERPL-MCNC: 9.3 MG/DL — SIGNIFICANT CHANGE UP (ref 8.4–10.5)
CHLORIDE SERPL-SCNC: 107 MMOL/L — SIGNIFICANT CHANGE UP (ref 96–108)
CO2 SERPL-SCNC: 24 MMOL/L — SIGNIFICANT CHANGE UP (ref 22–31)
CREAT SERPL-MCNC: 0.88 MG/DL — SIGNIFICANT CHANGE UP (ref 0.5–1.3)
EGFR: 77 ML/MIN/1.73M2 — SIGNIFICANT CHANGE UP
GLUCOSE SERPL-MCNC: 81 MG/DL — SIGNIFICANT CHANGE UP (ref 70–99)
HCT VFR BLD CALC: 35.6 % — SIGNIFICANT CHANGE UP (ref 34.5–45)
HGB BLD-MCNC: 11.3 G/DL — LOW (ref 11.5–15.5)
MCHC RBC-ENTMCNC: 31.7 GM/DL — LOW (ref 32–36)
MCHC RBC-ENTMCNC: 32.4 PG — SIGNIFICANT CHANGE UP (ref 27–34)
MCV RBC AUTO: 102 FL — HIGH (ref 80–100)
NRBC # BLD: 0 /100 WBCS — SIGNIFICANT CHANGE UP (ref 0–0)
PLATELET # BLD AUTO: 138 K/UL — LOW (ref 150–400)
POTASSIUM SERPL-MCNC: 4.3 MMOL/L — SIGNIFICANT CHANGE UP (ref 3.5–5.3)
POTASSIUM SERPL-SCNC: 4.3 MMOL/L — SIGNIFICANT CHANGE UP (ref 3.5–5.3)
RBC # BLD: 3.49 M/UL — LOW (ref 3.8–5.2)
RBC # FLD: 13.4 % — SIGNIFICANT CHANGE UP (ref 10.3–14.5)
SODIUM SERPL-SCNC: 137 MMOL/L — SIGNIFICANT CHANGE UP (ref 135–145)
WBC # BLD: 9.13 K/UL — SIGNIFICANT CHANGE UP (ref 3.8–10.5)
WBC # FLD AUTO: 9.13 K/UL — SIGNIFICANT CHANGE UP (ref 3.8–10.5)

## 2022-05-11 RX ORDER — ONDANSETRON 8 MG/1
4 TABLET, FILM COATED ORAL ONCE
Refills: 0 | Status: COMPLETED | OUTPATIENT
Start: 2022-05-11 | End: 2022-05-11

## 2022-05-11 RX ADMIN — OXYCODONE HYDROCHLORIDE 10 MILLIGRAM(S): 5 TABLET ORAL at 18:52

## 2022-05-11 RX ADMIN — ENOXAPARIN SODIUM 40 MILLIGRAM(S): 100 INJECTION SUBCUTANEOUS at 08:30

## 2022-05-11 RX ADMIN — OXYCODONE HYDROCHLORIDE 10 MILLIGRAM(S): 5 TABLET ORAL at 10:59

## 2022-05-11 RX ADMIN — OXYCODONE HYDROCHLORIDE 10 MILLIGRAM(S): 5 TABLET ORAL at 15:00

## 2022-05-11 RX ADMIN — ONDANSETRON 4 MILLIGRAM(S): 8 TABLET, FILM COATED ORAL at 23:08

## 2022-05-11 RX ADMIN — OXYCODONE HYDROCHLORIDE 10 MILLIGRAM(S): 5 TABLET ORAL at 06:14

## 2022-05-11 RX ADMIN — SENNA PLUS 2 TABLET(S): 8.6 TABLET ORAL at 21:57

## 2022-05-11 RX ADMIN — OXYCODONE HYDROCHLORIDE 10 MILLIGRAM(S): 5 TABLET ORAL at 07:00

## 2022-05-11 RX ADMIN — Medication 400 MILLIGRAM(S): at 06:01

## 2022-05-11 RX ADMIN — OXYCODONE HYDROCHLORIDE 10 MILLIGRAM(S): 5 TABLET ORAL at 23:12

## 2022-05-11 RX ADMIN — Medication 1000 MILLIGRAM(S): at 06:45

## 2022-05-11 RX ADMIN — OXYCODONE HYDROCHLORIDE 10 MILLIGRAM(S): 5 TABLET ORAL at 11:10

## 2022-05-11 RX ADMIN — OXYCODONE HYDROCHLORIDE 10 MILLIGRAM(S): 5 TABLET ORAL at 14:24

## 2022-05-11 RX ADMIN — HYDROMORPHONE HYDROCHLORIDE 0.5 MILLIGRAM(S): 2 INJECTION INTRAMUSCULAR; INTRAVENOUS; SUBCUTANEOUS at 20:43

## 2022-05-11 RX ADMIN — HYDROMORPHONE HYDROCHLORIDE 0.5 MILLIGRAM(S): 2 INJECTION INTRAMUSCULAR; INTRAVENOUS; SUBCUTANEOUS at 21:15

## 2022-05-11 NOTE — PROGRESS NOTE ADULT - ASSESSMENT
55 y/o female, PMHx vertigo, recently diagnosed breast cancer.     POD #2 s/p b/l simple mastectomy, L with sentinel node injection in OR, b/l breast reconstruction with abdominal JIAN flap    Flaps warm and well perfused with good Voptix, strong doppler signals  Monitoring tissue oximetry and physical exam closely for any indicators of changes in tissue perfusion that may represent early flap compromise.  Right breast inferior margin of vertical incision with small area of dehiscence not involving the flap, with scant serosanguineous drainage. Otherwise, breast exam remains unchanged and there is no stigmata of infection. Advised RN to place dressing to prevent infection and monitor output from site, as well as to call immediately for any further changes.  Monitoring drain outputs  Analgesia  Carolina removed   Tolerating diet, off IVF   Premedicate prior to ambulation tomorrow. Encouraged mobilization, incentive spirometry  Lovenox for DVT ppx  Will contact primary surgeon with any concern for flap compromise

## 2022-05-11 NOTE — PROGRESS NOTE ADULT - ASSESSMENT
ASSESSMENT:  1. JIAN h/o breast cancer      PLAN:  - Vioptix monitoring in place, alert if change in 10% in less than 1 hour, maintain as per surgical team    - Monitor JUVENTINO drain output    - Pain control with Tylenol, Toradol, Oxycodone, Morphine  - Supplement O2 as needed, wean as tolerated  - Encourage incentive spirometry use routinely  - Advance diet as tolerated  - TOV, rdz removal  - OOB to chair as tolerated  - Dispo planning to be decided by surgical team    - PPX: Lovenox  - GOC: Full CODE  Care transitioned to surgical team

## 2022-05-11 NOTE — PROGRESS NOTE ADULT - SUBJECTIVE AND OBJECTIVE BOX
SURGERY PROGRESS NOTE:    POD#2 bilat mastectomy with JIAN flaps    Pt resting comfortably no new complaints. no acute events overnight. Pain controlled. Carolina removed yesterday, patient voiding.   OOB to chair this morning.   No fevers/chills, chest pain, sob, dizziness.       Vital Signs Last 24 Hrs  T(C): 36.7 (11 May 2022 08:44), Max: 36.8 (11 May 2022 02:00)  T(F): 98 (11 May 2022 08:44), Max: 98.2 (11 May 2022 02:00)  HR: 68 (11 May 2022 08:44) (60 - 74)  BP: 108/61 (11 May 2022 08:44) (101/66 - 115/68)  RR: 15 (11 May 2022 08:44) (12 - 15)  SpO2: 99% (11 May 2022 08:44) (94% - 100%)    PHYSICAL EXAM:  Breast - flaps are warm, pink, no congestion,  Vioptix stable. no signs of hematoma forming. sarah drains in place - s/s output.   ABd - soft nt/nd incision cdi, sarah drains in place- s/s output. Umbilicus viable.     I&O's Detail    10 May 2022 07:01  -  11 May 2022 07:00  --------------------------------------------------------  IN:    Lactated Ringers: 825 mL    Sodium Chloride 0.9% Bolus: 500 mL  Total IN: 1325 mL    OUT:    Bulb (mL): 29.5 mL    Bulb (mL): 39.5 mL    Bulb (mL): 63 mL    Bulb (mL): 0 mL    Bulb (mL): 74.5 mL    Bulb (mL): 150 mL    Voided (mL): 2 mL  Total OUT: 358.5 mL    Total NET: 966.5 mL      LABS:                        11.3   9.13  )-----------( 138      ( 11 May 2022 06:20 )             35.6     05-11    137  |  107  |  20  ----------------------------<  81  4.3   |  24  |  0.88    Ca    9.3      11 May 2022 06:20  Phos  3.7     05-10  Mg     1.9     05-10    TPro  6.3  /  Alb  3.0<L>  /  TBili  1.0  /  DBili  x   /  AST  57<H>  /  ALT  37  /  AlkPhos  104  05-10      A/P:  POD #2 bilateral mastectomy, JIAN flaps. HD stable.     -continue vioptix /flap checks  -oob to chair, ambulate as tolerated  -pain control   -dvt prophylaxis  -Reg diet (NO CAFFEINE) - encourage po intake, d/c IVF  -support bra and binder place on day of discharge  -monitor h/h  Will d/w Dr. Montes

## 2022-05-11 NOTE — PROGRESS NOTE ADULT - SUBJECTIVE AND OBJECTIVE BOX
Follow-up Critical Care Progress Note  Chief Complaint : Other abnormal or inconclusive finding of diagnostic imaging of breast      Patient feels weak does not want to go home  no cp, sob, palp, n/v  voiding  tolerating diet  pain controlled       Allergies :contrast dye (Nausea)  No Known Drug Allergies      PAST MEDICAL & SURGICAL HISTORY:  Malignant neoplasm of unspecified site of left female breast    History of 2019 novel coronavirus disease (COVID-19)  3/2021 - not hospitalized but was treated with Abx therapy    H/O tubal ligation  1990    H/O colonoscopy        Medications:  MEDICATIONS  (STANDING):  enoxaparin Injectable 40 milliGRAM(s) SubCutaneous every 24 hours  ketorolac   Injectable 30 milliGRAM(s) IV Push every 6 hours  senna 2 Tablet(s) Oral at bedtime    MEDICATIONS  (PRN):  diazepam    Tablet 5 milliGRAM(s) Oral every 8 hours PRN Muscle spasm  diphenhydrAMINE 25 milliGRAM(s) Oral every 4 hours PRN Itching  HYDROmorphone  Injectable 0.5 milliGRAM(s) IV Push every 4 hours PRN Severe Pain (7 - 10)  oxyCODONE    IR 5 milliGRAM(s) Oral every 4 hours PRN Moderate Pain (4 - 6)  oxyCODONE    IR 10 milliGRAM(s) Oral every 4 hours PRN Severe Pain (7 - 10)      Antibiotics History  ceFAZolin   IVPB 2000 milliGRAM(s) IV Intermittent every 8 hours, 05-09-22 @ 19:43, Stop order after: 2 Doses      Heme Medications   enoxaparin Injectable 40 milliGRAM(s) SubCutaneous every 24 hours, 05-09-22 @ 19:42      GI Medications  senna 2 Tablet(s) Oral at bedtime, 05-09-22 @ 19:43, Routine      COVID  10-05-20 @ 00:58  COVID -   NotDetec       WBC Trend  05-11-22 @ 06:20   -  9.13  05-10-22 @ 06:19   -  14.31<H>    H/H Trend  05-11-22 @ 06:20   -   11.3<L>/ 35.6  05-10-22 @ 06:19   -   13.3/ 39.6  05-03-22 @ 11:05   -   14.6/ 42.4    Stool Occult Blood    Platelet Trend  05-11-22 @ 06:20   -  138<L>  05-10-22 @ 06:19   -  187    Trend Sodium  05-11-22 @ 06:20   -  137  05-10-22 @ 06:19   -  138    Trend Potassium  05-11-22 @ 06:20   -  4.3  05-10-22 @ 06:19   -  4.6    Trend Bun/Cr  05-11-22 @ 06:20  BUN/CR -  20 / 0.88  05-10-22 @ 06:19  BUN/CR -  14 / 0.92    Lactic Acid Trend    ABG Trend    Trend AST/ALT/ALK Phos/Bili  05-10-22 @ 06:19   57<H>/37/104/1.0  10-05-20 @ 00:58   29/43/129<H>/0.5      Ammonia Trend      Amylase / Lipase Trend      Albumin Trend  05-10-22 @ 06:19   -   3.0<L>  10-05-20 @ 00:58   -   3.6      PTT - PT - INR Trend    Glucose Trend  05-11-22 @ 06:20   -  -- 81 --  05-10-22 @ 06:19   -  -- 139<H> --        LABS:                        11.3   9.13  )-----------( 138      ( 11 May 2022 06:20 )             35.6     05-11    137  |  107  |  20  ----------------------------<  81  4.3   |  24  |  0.88    Ca    9.3      11 May 2022 06:20  Phos  3.7     05-10  Mg     1.9     05-10    TPro  6.3  /  Alb  3.0<L>  /  TBili  1.0  /  DBili  x   /  AST  57<H>  /  ALT  37  /  AlkPhos  104  05-10       VITALS:  T(C): 36.6 (05-11-22 @ 12:18), Max: 36.8 (05-11-22 @ 02:00)  T(F): 97.9 (05-11-22 @ 12:18), Max: 98.2 (05-11-22 @ 02:00)  HR: 68 (05-11-22 @ 12:18) (60 - 68)  BP: 99/59 (05-11-22 @ 12:18) (99/59 - 108/61)  BP(mean): --  ABP: --  ABP(mean): --  RR: 15 (05-11-22 @ 12:18) (12 - 15)  SpO2: 98% (05-11-22 @ 12:18) (98% - 100%)  CVP(mm Hg): --  CVP(cm H2O): --    Ins and Outs     05-10-22 @ 07:01  -  05-11-22 @ 07:00  --------------------------------------------------------  IN: 1325 mL / OUT: 358.5 mL / NET: 966.5 mL    05-11-22 @ 07:01  -  05-11-22 @ 14:19  --------------------------------------------------------  IN: 0 mL / OUT: 80 mL / NET: -80 mL        Height (cm): 157.5 (05-10-22 @ 09:48)  Weight (kg): 85.6 (05-10-22 @ 09:48)  BMI (kg/m2): 34.5 (05-10-22 @ 09:48)        I&O's Detail    10 May 2022 07:01  -  11 May 2022 07:00  --------------------------------------------------------  IN:    Lactated Ringers: 825 mL    Sodium Chloride 0.9% Bolus: 500 mL  Total IN: 1325 mL    OUT:    Bulb (mL): 29.5 mL    Bulb (mL): 39.5 mL    Bulb (mL): 63 mL    Bulb (mL): 0 mL    Bulb (mL): 74.5 mL    Bulb (mL): 150 mL    Voided (mL): 2 mL  Total OUT: 358.5 mL    Total NET: 966.5 mL      11 May 2022 07:01  -  11 May 2022 14:19  --------------------------------------------------------  IN:  Total IN: 0 mL    OUT:    Bulb (mL): 50 mL    Bulb (mL): 0 mL    Bulb (mL): 10 mL    Bulb (mL): 5 mL    Bulb (mL): 10 mL    Bulb (mL): 5 mL  Total OUT: 80 mL    Total NET: -80 mL     Physical Examination:  GENERAL:               Alert, Oriented, No acute distress.    HEENT:                  No JVD, Moist MM  PULM:                     Bilateral air entry, Clear to auscultation bilaterally, no significant sputum production, No Rales, No Rhonchi, No Wheezing  CVS:                         S1, S2,  No Murmur  ABD:                        Soft, nondistended, nontender, normoactive bowel sounds,   EXT:                         No edema, nontender, No Cyanosis or Clubbing   NEURO:                  Alert, oriented, interactive, nonfocal, follows commands  PSYC:                      Calm, + Insight.

## 2022-05-11 NOTE — PROGRESS NOTE ADULT - ATTENDING COMMENTS
Pt seen and examined. Agree with above  No complaints  Pain under control  Ambulated earlier    Flaps are warm, no congestion, vioptix stable, sarah serosang  Abd incision cdi, sarah serosang    Plan - POD#2    -Cont flap check  -OOB  -DVT prophyl  -Cont Toradol IV  -D/c planning

## 2022-05-11 NOTE — PROGRESS NOTE ADULT - SUBJECTIVE AND OBJECTIVE BOX
Patient is a 56y old  Female who presents with a chief complaint of s/p Brinda (11 May 2022 14:19)      Events last 24 hours: Called by RN to evaluate for "right breast leaking fluid"      PAST MEDICAL & SURGICAL HISTORY:  Malignant neoplasm of unspecified site of left female breast      History of 2019 novel coronavirus disease (COVID-19)  3/2021 - not hospitalized but was treated with Abx therapy      H/O tubal ligation  1990      H/O colonoscopy        SOCIAL HISTORY:        Review of Systems:  CONSTITUTIONAL: No fever, chills, or fatigue  EYES: No visual disturbances  ENMT:  No difficulty hearing  NECK: No pain  RESPIRATORY: No cough. No shortness of breath  CARDIOVASCULAR: No chest pain, palpitations, or leg swelling  GASTROINTESTINAL: No abdominal pain. No nausea, vomiting, diarrhea, or constipation. No hematemesis, melena or hematochezia  GENITOURINARY: No dysuria, frequency, hematuria, or incontinence  NEUROLOGICAL: No headaches, loss of strength, numbness, or tremors  SKIN: No rashes  MUSCULOSKELETAL: No back or extremity pain. No calf pain  PSYCHIATRIC: No depression, anxiety, or difficulty sleeping          Medications:        diazepam    Tablet 5 milliGRAM(s) Oral every 8 hours PRN  diphenhydrAMINE 25 milliGRAM(s) Oral every 4 hours PRN  HYDROmorphone  Injectable 0.5 milliGRAM(s) IV Push every 4 hours PRN  ketorolac   Injectable 30 milliGRAM(s) IV Push every 6 hours  oxyCODONE    IR 5 milliGRAM(s) Oral every 4 hours PRN  oxyCODONE    IR 10 milliGRAM(s) Oral every 4 hours PRN      enoxaparin Injectable 40 milliGRAM(s) SubCutaneous every 24 hours    senna 2 Tablet(s) Oral at bedtime                      ICU Vital Signs Last 24 Hrs  T(C): 37.4 (11 May 2022 20:02), Max: 37.4 (11 May 2022 20:02)  T(F): 99.4 (11 May 2022 20:02), Max: 99.4 (11 May 2022 20:02)  HR: 78 (11 May 2022 20:02) (60 - 80)  BP: 111/68 (11 May 2022 20:02) (99/59 - 111/68)  BP(mean): --  ABP: --  ABP(mean): --  RR: 18 (11 May 2022 20:02) (12 - 18)  SpO2: 99% (11 May 2022 20:02) (92% - 100%)          I&O's Detail    10 May 2022 07:01  -  11 May 2022 07:00  --------------------------------------------------------  IN:    Lactated Ringers: 825 mL    Sodium Chloride 0.9% Bolus: 500 mL  Total IN: 1325 mL    OUT:    Bulb (mL): 29.5 mL    Bulb (mL): 39.5 mL    Bulb (mL): 63 mL    Bulb (mL): 0 mL    Bulb (mL): 74.5 mL    Bulb (mL): 150 mL    Voided (mL): 2 mL  Total OUT: 358.5 mL    Total NET: 966.5 mL      11 May 2022 07:01  -  11 May 2022 23:46  --------------------------------------------------------  IN:  Total IN: 0 mL    OUT:    Bulb (mL): 70 mL    Bulb (mL): 25 mL    Bulb (mL): 2.5 mL    Bulb (mL): 7.5 mL    Bulb (mL): 95 mL    Bulb (mL): 19 mL  Total OUT: 219 mL    Total NET: -219 mL            LABS:                        11.3   9.13  )-----------( 138      ( 11 May 2022 06:20 )             35.6     05-11    137  |  107  |  20  ----------------------------<  81  4.3   |  24  |  0.88    Ca    9.3      11 May 2022 06:20  Phos  3.7     05-10  Mg     1.9     05-10    TPro  6.3  /  Alb  3.0<L>  /  TBili  1.0  /  DBili  x   /  AST  57<H>  /  ALT  37  /  AlkPhos  104  05-10          CAPILLARY BLOOD GLUCOSE            CULTURES:            Physical Examination:    General: No acute distress.      HEENT: Pupils equal, reactive to light.  Symmetric.    PULM: Clear to auscultation bilaterally, no significant sputum production    CVS: Regular rate and rhythm, no murmurs, rubs, or gallops    ABD: Soft, nondistended, nontender, normoactive bowel sounds, no masses    EXT: No edema, nontender    SKIN: Warm and well perfused, no rashes noted.    NEURO: Alert, oriented, interactive, nonfocal        RADIOLOGY: ***        INVASIVE LINES:  INDWELLING MOORE:  VTE PROPHYLAXIS:  CAM ICU:  CODE STATUS:        CRITICAL CARE TIME SPENT: *** minutes spent performing frequent bedside reassessments and augmenting plan of care to address problems of acute critical illness that pose high probability of life threatening deterioration and/or end organ damage/dysfunction and discussing goals of care, non-inclusive of time spent on procedures performed. Patient is a 56y old  Female who presents with a chief complaint of s/p Brinda (11 May 2022 14:19)    HPI: 55 y/o female, PMHx vertigo, recently diagnosed breast cancer now s/p b/l simple mastectomy, L with sentinel node injection in OR, b/l breast reconstruction with abdominal BRINDA flap on 5/9.      Events last 24 hours: POD #2 s/p bilateral mastectomy and BRINDA flap creation  Called by RN to evaluate for "right breast leaking fluid". RN is unsure of when it started.  Noted to have small punctate opening at inferior margin of right breast vertical incision (6 o'clock position) able to express scant serosanguineous fluid from site  No tenderness on exam, no surrounding erythema. Skin and flap warm and well perfused.    Vioptix monitoring ongoing, at time of assessment:     Right:  StO2 59%  Strength 96    Left:  StO2 63%  Strength 92        PAST MEDICAL & SURGICAL HISTORY:  Malignant neoplasm of unspecified site of left female breast      History of 2019 novel coronavirus disease (COVID-19)  3/2021 - not hospitalized but was treated with Abx therapy      H/O tubal ligation  1990      H/O colonoscopy        SOCIAL HISTORY:        Review of Systems: Pain well controlled  CONSTITUTIONAL: No fever, chills, or fatigue  EYES: No visual disturbances  ENMT:  No difficulty hearing  NECK: No pain  RESPIRATORY: No cough. No shortness of breath  CARDIOVASCULAR: No chest pain, palpitations, or leg swelling  GASTROINTESTINAL: No abdominal pain. No nausea, vomiting, diarrhea, or constipation. No hematemesis, melena or hematochezia  GENITOURINARY: No dysuria, frequency, hematuria, or incontinence  NEUROLOGICAL: No headaches, loss of strength, numbness, or tremors  SKIN: No rashes  MUSCULOSKELETAL: No back or extremity pain. No calf pain  PSYCHIATRIC: No depression, anxiety, or difficulty sleeping          Medications:  diazepam    Tablet 5 milliGRAM(s) Oral every 8 hours PRN  diphenhydrAMINE 25 milliGRAM(s) Oral every 4 hours PRN  HYDROmorphone  Injectable 0.5 milliGRAM(s) IV Push every 4 hours PRN  ketorolac   Injectable 30 milliGRAM(s) IV Push every 6 hours  oxyCODONE    IR 5 milliGRAM(s) Oral every 4 hours PRN  oxyCODONE    IR 10 milliGRAM(s) Oral every 4 hours PRN  enoxaparin Injectable 40 milliGRAM(s) SubCutaneous every 24 hours  senna 2 Tablet(s) Oral at bedtime          ICU Vital Signs Last 24 Hrs  T(C): 37.4 (11 May 2022 20:02), Max: 37.4 (11 May 2022 20:02)  T(F): 99.4 (11 May 2022 20:02), Max: 99.4 (11 May 2022 20:02)  HR: 78 (11 May 2022 20:02) (60 - 80)  BP: 111/68 (11 May 2022 20:02) (99/59 - 111/68)  BP(mean): --  ABP: --  ABP(mean): --  RR: 18 (11 May 2022 20:02) (12 - 18)  SpO2: 99% (11 May 2022 20:02) (92% - 100%)          I&O's Detail    10 May 2022 07:01  -  11 May 2022 07:00  --------------------------------------------------------  IN:    Lactated Ringers: 825 mL    Sodium Chloride 0.9% Bolus: 500 mL  Total IN: 1325 mL    OUT:    Bulb (mL): 29.5 mL    Bulb (mL): 39.5 mL    Bulb (mL): 63 mL    Bulb (mL): 0 mL    Bulb (mL): 74.5 mL    Bulb (mL): 150 mL    Voided (mL): 2 mL  Total OUT: 358.5 mL    Total NET: 966.5 mL      11 May 2022 07:01  -  11 May 2022 23:46  --------------------------------------------------------  IN:  Total IN: 0 mL    OUT:    Bulb (mL): 70 mL    Bulb (mL): 25 mL    Bulb (mL): 2.5 mL    Bulb (mL): 7.5 mL    Bulb (mL): 95 mL    Bulb (mL): 19 mL  Total OUT: 219 mL    Total NET: -219 mL            LABS:                        11.3   9.13  )-----------( 138      ( 11 May 2022 06:20 )             35.6     05-11    137  |  107  |  20  ----------------------------<  81  4.3   |  24  |  0.88    Ca    9.3      11 May 2022 06:20  Phos  3.7     05-10  Mg     1.9     05-10    TPro  6.3  /  Alb  3.0<L>  /  TBili  1.0  /  DBili  x   /  AST  57<H>  /  ALT  37  /  AlkPhos  104  05-10          CAPILLARY BLOOD GLUCOSE            CULTURES:            Physical Examination:    General: No acute distress.  Alert, oriented, interactive, nonfocal    BREAST: Right inferior margin of vertical incision with punctate opening, slowly leaking serosanguineous fluid. There is no surrounding erythema or increased warmth, no purulent drainage, and no tenderness to palpation. Breasts appears symmetrical, no signs of hematoma, soft to palpation, non tender. Skin is normal color for race, well perfused. Flaps are warm to touch. Vioptix in place functioning well. JUVENTINO drains in place with serosanguinous drainage.     HEENT: Pupils equal, reactive to light.  Symmetric.    PULM: Clear to auscultation bilaterally, no significant sputum production    CVS: Regular rate and rhythm, no murmurs, rubs, or gallops    ABD: Soft, nondistended, nontender, normoactive bowel sounds, no masses. Flap removal site well approximated, staples in place. No active drainage, bleeding, or signs of infection.    EXT: No edema, nontender    SKIN: Warm and well perfused, no rashes noted.

## 2022-05-12 RX ORDER — ACETAMINOPHEN 500 MG
650 TABLET ORAL EVERY 6 HOURS
Refills: 0 | Status: DISCONTINUED | OUTPATIENT
Start: 2022-05-12 | End: 2022-05-13

## 2022-05-12 RX ORDER — POLYETHYLENE GLYCOL 3350 17 G/17G
17 POWDER, FOR SOLUTION ORAL DAILY
Refills: 0 | Status: DISCONTINUED | OUTPATIENT
Start: 2022-05-12 | End: 2022-05-13

## 2022-05-12 RX ORDER — ONDANSETRON 8 MG/1
4 TABLET, FILM COATED ORAL ONCE
Refills: 0 | Status: COMPLETED | OUTPATIENT
Start: 2022-05-12 | End: 2022-05-12

## 2022-05-12 RX ORDER — MECLIZINE HCL 12.5 MG
12.5 TABLET ORAL DAILY
Refills: 0 | Status: DISCONTINUED | OUTPATIENT
Start: 2022-05-12 | End: 2022-05-13

## 2022-05-12 RX ORDER — SENNA PLUS 8.6 MG/1
1 TABLET ORAL AT BEDTIME
Refills: 0 | Status: DISCONTINUED | OUTPATIENT
Start: 2022-05-12 | End: 2022-05-12

## 2022-05-12 RX ADMIN — Medication 30 MILLIGRAM(S): at 00:11

## 2022-05-12 RX ADMIN — Medication 30 MILLIGRAM(S): at 07:00

## 2022-05-12 RX ADMIN — OXYCODONE HYDROCHLORIDE 10 MILLIGRAM(S): 5 TABLET ORAL at 04:12

## 2022-05-12 RX ADMIN — Medication 30 MILLIGRAM(S): at 15:30

## 2022-05-12 RX ADMIN — Medication 30 MILLIGRAM(S): at 06:12

## 2022-05-12 RX ADMIN — POLYETHYLENE GLYCOL 3350 17 GRAM(S): 17 POWDER, FOR SOLUTION ORAL at 15:07

## 2022-05-12 RX ADMIN — Medication 12.5 MILLIGRAM(S): at 16:23

## 2022-05-12 RX ADMIN — OXYCODONE HYDROCHLORIDE 10 MILLIGRAM(S): 5 TABLET ORAL at 00:12

## 2022-05-12 RX ADMIN — OXYCODONE HYDROCHLORIDE 10 MILLIGRAM(S): 5 TABLET ORAL at 19:48

## 2022-05-12 RX ADMIN — Medication 30 MILLIGRAM(S): at 00:40

## 2022-05-12 RX ADMIN — Medication 30 MILLIGRAM(S): at 15:12

## 2022-05-12 RX ADMIN — OXYCODONE HYDROCHLORIDE 10 MILLIGRAM(S): 5 TABLET ORAL at 03:12

## 2022-05-12 RX ADMIN — SENNA PLUS 2 TABLET(S): 8.6 TABLET ORAL at 21:13

## 2022-05-12 RX ADMIN — ONDANSETRON 4 MILLIGRAM(S): 8 TABLET, FILM COATED ORAL at 12:20

## 2022-05-12 RX ADMIN — ENOXAPARIN SODIUM 40 MILLIGRAM(S): 100 INJECTION SUBCUTANEOUS at 07:17

## 2022-05-12 RX ADMIN — OXYCODONE HYDROCHLORIDE 10 MILLIGRAM(S): 5 TABLET ORAL at 21:13

## 2022-05-12 NOTE — PROGRESS NOTE ADULT - ASSESSMENT
55 y/o female, PMHx vertigo, recently diagnosed breast cancer now s/p b/l simple mastectomy, L with sentinel node injection in OR, b/l breast reconstruction with abdominal JIAN flap on 5/9.    POD #3 s/p b/l simple mastectomy, L with sentinel node injection in OR, b/l breast reconstruction with abdominal JIAN flap    Neuro:  - No active issues  - Avoid Neuro Deliriogenic / sedative medications    CV:  - Normotensive, no active issues    Pulm:  - Supplemental oxygen if needed to maintain SPO2> 94%  - No active issues                  GI:  - Tolerating Regular diet    Renal:  - Preserved Renal Function Continue to monitor Bun/Cr and UOP  - Replacing electrolytes as needed with Goal K> 4, PO> 3, Mg> 2               - Strict I&O's    Heme:  - Lovenox for DVT prophylaxis    ID:  - WBC 9.13,  remains afebrile with no antipyretics administered   - Microbiology and Radiology reviewed   - trend CBC with diff, CMP  and fever curve  - Avoiding antibiotics unless there is a concern for a bacterial/fungal infection.    Endo:  - ISS for aggressive glycemic control to limit FS glucose to < 180mg/dl.    SKIN:  - Flaps warm and well perfused with good Voptix, strong doppler signals  - Monitoring tissue oximetry and physical exam closely for any indicators of changes in tissue perfusion that may represent early flap compromise.  - Will contact primary surgeon with any concern for flap compromise    D/C planning    Non Critical Care Time:  25 minutes were spent assessing the patient's presenting problems of acute illness that pose a high probability of life threatening  deterioration or end organ damage / dysfunction.  Medical decision making includes initiation / continuation of plan or care review data/ lab work radiographic study, direct patient care bedside ,  discussions with  consultants regarding care,  evaluation and interpretation of vital signs, any necessary ventilator management,   NIV or BIPAP changes  or initiation,    discussions with multidisciplinary team,  am or pm rounds, discussions of goals of care with patient and family all non-inclusive of procedures.

## 2022-05-12 NOTE — PROGRESS NOTE ADULT - ASSESSMENT
POD #3 bilat mastectomy/  JIAN     -zofran for N/V  -meclizine prn for dizziness(home med)  -encourage ambulation   -increase po intake  -IS  -pain controlled  -add miralax to  bowel regimen  -d/c planning home tomorrow  -VNS consult ordered POD #3 bilat mastectomy/  JIAN     -zofran for N/V  -meclizine prn for dizziness(home med)  -encourage ambulation   -reg diet  -increase po intake  -IS  -pain controlled  -add miralax to  bowel regimen  -d/c planning home tomorrow  -VNS consult ordered  -dvt prophylaxis

## 2022-05-12 NOTE — PHYSICAL THERAPY INITIAL EVALUATION ADULT - ADDITIONAL COMMENTS
pt lives w/son in private house [address] w/1 rail, 1st floor setup. pt is independent w/ambulation and ADL's

## 2022-05-12 NOTE — PROGRESS NOTE ADULT - SUBJECTIVE AND OBJECTIVE BOX
POD #3 bilat mastectomy/SLN Bx/JIAN flap    Pt sitting up in chair. flat affect.  no acute events overnight. pain controlled. c/o N/V and dizziness.  poor po intake. voiding    Vital Signs Last 24 Hrs  T(C): 36.4 (12 May 2022 13:05), Max: 37.5 (11 May 2022 23:29)  T(F): 97.6 (12 May 2022 13:05), Max: 99.5 (11 May 2022 23:29)  HR: 68 (12 May 2022 13:05) (65 - 80)  BP: 114/64 (12 May 2022 13:05) (101/59 - 114/64)  BP(mean): 73 (12 May 2022 05:00) (73 - 73)  RR: 12 (12 May 2022 13:05) (12 - 18)  SpO2: 100% (12 May 2022 13:05) (92% - 100%)    PE: breast: skin and flaps viable and pink. vopitix inplace gd signal quality and sats. jps serosanguinous no signs of hematoma    Abd: incision clean and dry. jps intact serosanguinous.    no new labs         I&O's Detail    11 May 2022 07:01  -  12 May 2022 07:00  --------------------------------------------------------  IN:  Total IN: 0 mL    OUT:    Bulb (mL): 32.5 mL    Bulb (mL): 34 mL    Bulb (mL): 2.5 mL    Bulb (mL): 117 mL    Bulb (mL): 7.5 mL    Bulb (mL): 114 mL    Voided (mL): 1 mL  Total OUT: 308.5 mL    Total NET: -308.5 mL      12 May 2022 07:01  -  12 May 2022 13:36  --------------------------------------------------------  IN:  Total IN: 0 mL    OUT:    Bulb (mL): 1 mL    Bulb (mL): 1 mL    Bulb (mL): 25 mL    Bulb (mL): 15 mL    Bulb (mL): 30 mL    Bulb (mL): 10 mL  Total OUT: 82 mL    Total NET: -82 mL

## 2022-05-12 NOTE — PROGRESS NOTE ADULT - ASSESSMENT
Physical Examination:  GENERAL:               Alert, Oriented, No acute distress.    HEENT:                  No JVD, Moist MM  PULM:                     Bilateral air entry, Clear to auscultation bilaterally, no significant sputum production, No Rales, No Rhonchi, No Wheezing  CVS:                         S1, S2,  No Murmur  ABD:                        Soft, nondistended, nontender, normoactive bowel sounds,   EXT:                         No edema, nontender, No Cyanosis or Clubbing   NEURO:                  Alert, oriented, interactive, nonfocal, follows commands  PSYC:                      Calm, + Insight.      ASSESSMENT:  1. JIAN h/o breast cancer      PLAN:  - Monitor JUVENTINO drain output    - Pain control with Tylenol, Toradol, Oxycodone, Morphine  - Supplement O2 as needed, wean as tolerated  - Encourage incentive spirometry use routinely  - Advance diet as tolerated   - OOB to chair as tolerated  - Dispo planning to be decided by surgical team    - PPX: Lovenox  - GOC: Full CODE  Care transitioned to surgical team

## 2022-05-12 NOTE — PROGRESS NOTE ADULT - SUBJECTIVE AND OBJECTIVE BOX
Patient is a 56y old  Female who presents with a chief complaint of JIAN (12 May 2022 14:16)      BRIEF HOSPITAL COURSE: ***  Events last 24 hours: ***    PAST MEDICAL & SURGICAL HISTORY:  Malignant neoplasm of unspecified site of left female breast      History of 2019 novel coronavirus disease (COVID-19)  3/2021 - not hospitalized but was treated with Abx therapy      H/O tubal ligation  1990      H/O colonoscopy          Review of Systems:  CONSTITUTIONAL: No fever, chills, or fatigue.  EYES: No eye pain, visual disturbances, or discharge.  ENMT:  No difficulty hearing, tinnitus, or vertigo. No sinus or throat pain.  NECK: No pain or stiffness.  RESPIRATORY: No shortness of breath, cough, or wheezing.  CARDIOVASCULAR: No chest pain, palpitations, dizziness, or leg swelling.  GASTROINTESTINAL: No abdominal or epigastric pain. No nausea, vomiting, diarrhea, or constipation. No hematemesis, melena, or hematochezia.  GENITOURINARY: No dysuria, increased frequency, hematuria, or incontinence.  NEUROLOGICAL: No headaches, memory loss, loss of strength, numbness, or tremors.  SKIN: No itching, burning, rashes, or lesions.  MUSCULOSKELETAL: No joint pain or swelling. No muscle, back, or extremity pain.  PSYCHIATRIC: No depression, anxiety, mood swings, or difficulty sleeping.    Medications:        acetaminophen     Tablet .. 650 milliGRAM(s) Oral every 6 hours PRN  diazepam    Tablet 5 milliGRAM(s) Oral every 8 hours PRN  diphenhydrAMINE 25 milliGRAM(s) Oral every 4 hours PRN  meclizine 12.5 milliGRAM(s) Oral daily PRN  oxyCODONE    IR 5 milliGRAM(s) Oral every 4 hours PRN  oxyCODONE    IR 10 milliGRAM(s) Oral every 4 hours PRN      enoxaparin Injectable 40 milliGRAM(s) SubCutaneous every 24 hours    polyethylene glycol 3350 17 Gram(s) Oral daily  senna 2 Tablet(s) Oral at bedtime                      ICU Vital Signs Last 24 Hrs  T(C): 36.7 (12 May 2022 20:04), Max: 37.5 (11 May 2022 23:29)  T(F): 98 (12 May 2022 20:04), Max: 99.5 (11 May 2022 23:29)  HR: 63 (12 May 2022 20:04) (63 - 77)  BP: 123/69 (12 May 2022 20:04) (101/59 - 123/72)  BP(mean): 73 (12 May 2022 05:00) (73 - 73)  ABP: --  ABP(mean): --  RR: 19 (12 May 2022 20:04) (12 - 19)  SpO2: 97% (12 May 2022 20:04) (96% - 100%)          I&O's Detail    11 May 2022 07:01  -  12 May 2022 07:00  --------------------------------------------------------  IN:  Total IN: 0 mL    OUT:    Bulb (mL): 32.5 mL    Bulb (mL): 34 mL    Bulb (mL): 2.5 mL    Bulb (mL): 117 mL    Bulb (mL): 7.5 mL    Bulb (mL): 114 mL    Voided (mL): 1 mL  Total OUT: 308.5 mL    Total NET: -308.5 mL      12 May 2022 07:01  -  12 May 2022 22:16  --------------------------------------------------------  IN:  Total IN: 0 mL    OUT:    Bulb (mL): 3 mL    Bulb (mL): 85 mL    Bulb (mL): 60 mL    Bulb (mL): 3 mL    Bulb (mL): 25 mL    Bulb (mL): 50 mL  Total OUT: 226 mL    Total NET: -226 mL          LABS:                        11.3   9.13  )-----------( 138      ( 11 May 2022 06:20 )             35.6     05-11    137  |  107  |  20  ----------------------------<  81  4.3   |  24  |  0.88    Ca    9.3      11 May 2022 06:20            CAPILLARY BLOOD GLUCOSE            CULTURES:      Physical Examination:    VITALS AT TIME OF EXAM:  HR:  BP:  RR:  SPO2:    General: Well appearing, lying in bed in NAD.      HEENT: Pupils equal, reactive to light. Symmetric. No scleral icterus or injection.    PULM: Clear to auscultation B/L. No wheezes, rales, or rhonchi apprecaited. No significant sputum production or increased respiratory effort.    NECK: Supple, no lymphadenopathy, trachea midline.    CVS: Regular rate and rhythm, no murmurs appreciated, +s1/s2.    ABD: Soft, nondistended, nontender, normoactive bowel sounds.    EXT: No edema, nontender.    SKIN: Warm and well perfused, no rashes noted.    NEURO: Alert, oriented, interactive, nonfocal.    POCUS:    DEVICES:   LINES:  OSCAR:    RADIOLOGY: ***   Patient is a 56y old  Female who presents with a chief complaint of JIAN (12 May 2022 14:16)      BRIEF HOSPITAL COURSE: 55 y/o female, PMHx vertigo, recently diagnosed breast cancer now s/p b/l simple mastectomy, L with sentinel node injection in OR, b/l breast reconstruction with abdominal JIAN flap on 5/9.    Events last 24 hours: No new events, Normotensive in NAD/NARD.  Vioptix monitoring ongoing, at time of assessment:     Right:  StO2 59%  Strength 97    Left:  StO2 62%  Strength 92      PAST MEDICAL & SURGICAL HISTORY:  Malignant neoplasm of unspecified site of left female breast      History of 2019 novel coronavirus disease (COVID-19)  3/2021 - not hospitalized but was treated with Abx therapy      H/O tubal ligation  1990      H/O colonoscopy          Review of Systems:  CONSTITUTIONAL: No fever, chills, or fatigue.  EYES: No eye pain, visual disturbances, or discharge.  ENMT:  No difficulty hearing, tinnitus, or vertigo. No sinus or throat pain.  NECK: No pain or stiffness.  RESPIRATORY: No shortness of breath, cough, or wheezing.  CARDIOVASCULAR: No chest pain, palpitations, dizziness, or leg swelling.  GASTROINTESTINAL: No abdominal or epigastric pain. No nausea, vomiting, diarrhea, or constipation. No hematemesis, melena, or hematochezia.  GENITOURINARY: No dysuria, increased frequency, hematuria, or incontinence.  NEUROLOGICAL: No headaches, memory loss, loss of strength, numbness, or tremors.  SKIN: No itching, burning, rashes, or lesions.  MUSCULOSKELETAL: No joint pain or swelling. No muscle, back, or extremity pain.  PSYCHIATRIC: No depression, anxiety, mood swings, or difficulty sleeping.    Medications:        acetaminophen     Tablet .. 650 milliGRAM(s) Oral every 6 hours PRN  diazepam    Tablet 5 milliGRAM(s) Oral every 8 hours PRN  diphenhydrAMINE 25 milliGRAM(s) Oral every 4 hours PRN  meclizine 12.5 milliGRAM(s) Oral daily PRN  oxyCODONE    IR 5 milliGRAM(s) Oral every 4 hours PRN  oxyCODONE    IR 10 milliGRAM(s) Oral every 4 hours PRN      enoxaparin Injectable 40 milliGRAM(s) SubCutaneous every 24 hours    polyethylene glycol 3350 17 Gram(s) Oral daily  senna 2 Tablet(s) Oral at bedtime                      ICU Vital Signs Last 24 Hrs  T(C): 36.7 (12 May 2022 20:04), Max: 37.5 (11 May 2022 23:29)  T(F): 98 (12 May 2022 20:04), Max: 99.5 (11 May 2022 23:29)  HR: 63 (12 May 2022 20:04) (63 - 77)  BP: 123/69 (12 May 2022 20:04) (101/59 - 123/72)  BP(mean): 73 (12 May 2022 05:00) (73 - 73)  ABP: --  ABP(mean): --  RR: 19 (12 May 2022 20:04) (12 - 19)  SpO2: 97% (12 May 2022 20:04) (96% - 100%)          I&O's Detail    11 May 2022 07:01  -  12 May 2022 07:00  --------------------------------------------------------  IN:  Total IN: 0 mL    OUT:    Bulb (mL): 32.5 mL    Bulb (mL): 34 mL    Bulb (mL): 2.5 mL    Bulb (mL): 117 mL    Bulb (mL): 7.5 mL    Bulb (mL): 114 mL    Voided (mL): 1 mL  Total OUT: 308.5 mL    Total NET: -308.5 mL      12 May 2022 07:01  -  12 May 2022 22:16  --------------------------------------------------------  IN:  Total IN: 0 mL    OUT:    Bulb (mL): 3 mL    Bulb (mL): 85 mL    Bulb (mL): 60 mL    Bulb (mL): 3 mL    Bulb (mL): 25 mL    Bulb (mL): 50 mL  Total OUT: 226 mL    Total NET: -226 mL          LABS:                        11.3   9.13  )-----------( 138      ( 11 May 2022 06:20 )             35.6     05-11    137  |  107  |  20  ----------------------------<  81  4.3   |  24  |  0.88    Ca    9.3      11 May 2022 06:20            CAPILLARY BLOOD GLUCOSE            CULTURES:      Physical Examination:    General: Well appearing, lying in bed in NAD.      HEENT: Pupils equal, reactive to light. Symmetric. No scleral icterus or injection.    PULM: Clear to auscultation B/L. No wheezes, rales, or rhonchi appreciated No significant sputum production or increased respiratory effort.    NECK: Supple, no lymphadenopathy, trachea midline.    CVS: Regular rate and rhythm, no murmurs appreciated, +s1/s2.    ABD: Soft, nondistended, nontender, normoactive bowel sounds.    EXT: No edema, nontender.    SKIN: Warm and well perfused, no rashes noted.    NEURO: Alert, oriented, interactive, nonfocal.      RADIOLOGY: < from: Xray Chest 2 Views PA/Lat (05.03.22 @ 11:39) >  PROCEDURE DATE:  05/03/2022          INTERPRETATION:  AP chest on May 3, 2022 at 10:47 AM. This is a   preprocedure chest. There is history of Covid.    Heart size within normal limits.    Linear scar right apex again noted.    Chest is similar to October 5, 2020.    IMPRESSION: Again noted is linear scar right apex.    --- End of Report ---        < end of copied text >     Patient is a 56y old  Female who presents with a chief complaint of JIAN (12 May 2022 14:16)      BRIEF HOSPITAL COURSE: 55 y/o female, PMHx vertigo, recently diagnosed breast cancer now s/p b/l simple mastectomy, L with sentinel node injection in OR, b/l breast reconstruction with abdominal JIAN flap on 5/9.    Events last 24 hours: No new events, Normotensive in NAD/NARD.  Vioptix monitoring ongoing, at time of assessment:     Right:  StO2 59%  Strength 97    Left:  StO2 62%  Strength 92      Interval Hx:      PAST MEDICAL & SURGICAL HISTORY:  Malignant neoplasm of unspecified site of left female breast      History of 2019 novel coronavirus disease (COVID-19)  3/2021 - not hospitalized but was treated with Abx therapy      H/O tubal ligation  1990      H/O colonoscopy          MEDICATIONS  (STANDING):  enoxaparin Injectable 40 milliGRAM(s) SubCutaneous every 24 hours  polyethylene glycol 3350 17 Gram(s) Oral daily  senna 2 Tablet(s) Oral at bedtime    MEDICATIONS  (PRN):  acetaminophen     Tablet .. 650 milliGRAM(s) Oral every 6 hours PRN Mild Pain (1 - 3)  diazepam    Tablet 5 milliGRAM(s) Oral every 8 hours PRN Muscle spasm  diphenhydrAMINE 25 milliGRAM(s) Oral every 4 hours PRN Itching  meclizine 12.5 milliGRAM(s) Oral daily PRN Dizziness  oxyCODONE    IR 10 milliGRAM(s) Oral every 4 hours PRN Severe Pain (7 - 10)  oxyCODONE    IR 5 milliGRAM(s) Oral every 4 hours PRN Moderate Pain (4 - 6)      Review of Systems:  CONSTITUTIONAL: No fever, chills, or fatigue  EYES: No eye pain, visual disturbances, or discharge  ENMT:  No difficulty hearing, tinnitus, vertigo; No sinus or throat pain  NECK: No pain or stiffness  RESPIRATORY: No cough, wheezing, chills or hemoptysis; No shortness of breath  CARDIOVASCULAR: No chest pain, palpitations, dizziness, or leg swelling  GASTROINTESTINAL: No abdominal or epigastric pain. No nausea, vomiting, or hematemesis; No diarrhea or constipation. No melena or hematochezia.  GENITOURINARY: No dysuria, frequency, hematuria, or incontinence  NEUROLOGICAL: No headaches, memory loss, loss of strength, numbness, or tremors  SKIN: No itching, burning, rashes, or lesions   MUSCULOSKELETAL: No joint pain or swelling; No muscle, back, or extremity pain  PSYCHIATRIC: No depression, anxiety, mood swings, or difficulty sleeping      ICU Vital Signs Last 24 Hrs  T(C): 36.7 (12 May 2022 20:04), Max: 37 (12 May 2022 05:00)  T(F): 98 (12 May 2022 20:04), Max: 98.6 (12 May 2022 05:00)  HR: 63 (12 May 2022 20:04) (63 - 77)  BP: 123/69 (12 May 2022 20:04) (101/59 - 123/72)  BP(mean): 73 (12 May 2022 05:00) (73 - 73)  ABP: --  ABP(mean): --  RR: 19 (12 May 2022 20:04) (12 - 19)  SpO2: 97% (12 May 2022 20:04) (96% - 100%)                              11.3   9.13  )-----------( 138      ( 11 May 2022 06:20 )             35.6       05-11    137  |  107  |  20  ----------------------------<  81  4.3   |  24  |  0.88    Ca    9.3      11 May 2022 06:20            Physical Examination:    General: No acute distress.  Alert, oriented, interactive, nonfocal    BREAST: breasts appears symmetrical, no signs of hematoma, soft to palpation, non tender, well perfused, turgor maintained,cap refill adequate. Viotpix in place functioning well. JUVENTINO drains in place with serosanguinous drainage.     HEENT: Pupils equal, reactive to light.  Symmetric.    PULM: Clear to auscultation bilaterally, no significant sputum production    CVS: Regular rate and rhythm, no murmurs, rubs, or gallops    ABD: Soft, nondistended, nontender, normoactive bowel sounds, no masses. Flap removal site well approximated, staples in palce. No ative drainage, bleeding, or signs of infection.    EXT: No edema, nontender    SKIN: Warm and well perfused, no rashes noted.          RADIOLOGY: < from: Xray Chest 2 Views PA/Lat (05.03.22 @ 11:39) >  PROCEDURE DATE:  05/03/2022          INTERPRETATION:  AP chest on May 3, 2022 at 10:47 AM. This is a   preprocedure chest. There is history of Covid.    Heart size within normal limits.    Linear scar right apex again noted.    Chest is similar to October 5, 2020.    IMPRESSION: Again noted is linear scar right apex.    --- End of Report ---        < end of copied text >

## 2022-05-12 NOTE — PROGRESS NOTE ADULT - SUBJECTIVE AND OBJECTIVE BOX
No significant events  Pain under control  Minimal ambulation yesterday    Vital Signs Last 24 Hrs  T(C): 37 (12 May 2022 05:00), Max: 37.5 (11 May 2022 23:29)  T(F): 98.6 (12 May 2022 05:00), Max: 99.5 (11 May 2022 23:29)  HR: 77 (12 May 2022 05:00) (68 - 80)  BP: 101/59 (12 May 2022 05:00) (99/59 - 112/63)  BP(mean): 73 (12 May 2022 05:00) (73 - 73)  RR: 16 (12 May 2022 05:00) (15 - 18)  SpO2: 96% (12 May 2022 05:00) (92% - 99%)      Breast- flaps are warm, no congestion, vioptix stable, sarah serosang  Abd - incision cdi, sarah serosang

## 2022-05-12 NOTE — PROGRESS NOTE ADULT - SUBJECTIVE AND OBJECTIVE BOX
Follow-up Critical Care Progress Note  Chief Complaint : Other abnormal or inconclusive finding of diagnostic imaging of breast          No new events overnight.  Denies SOB/CP.   feels weak     Allergies :contrast dye (Nausea)  No Known Drug Allergies      PAST MEDICAL & SURGICAL HISTORY:  Malignant neoplasm of unspecified site of left female breast    History of 2019 novel coronavirus disease (COVID-19)  3/2021 - not hospitalized but was treated with Abx therapy    H/O tubal ligation  1990    H/O colonoscopy        Medications:  MEDICATIONS  (STANDING):  enoxaparin Injectable 40 milliGRAM(s) SubCutaneous every 24 hours  ketorolac   Injectable 30 milliGRAM(s) IV Push every 6 hours  polyethylene glycol 3350 17 Gram(s) Oral daily  senna 2 Tablet(s) Oral at bedtime    MEDICATIONS  (PRN):  diazepam    Tablet 5 milliGRAM(s) Oral every 8 hours PRN Muscle spasm  diphenhydrAMINE 25 milliGRAM(s) Oral every 4 hours PRN Itching  HYDROmorphone  Injectable 0.5 milliGRAM(s) IV Push every 4 hours PRN Severe Pain (7 - 10)  meclizine 12.5 milliGRAM(s) Oral daily PRN Dizziness  oxyCODONE    IR 10 milliGRAM(s) Oral every 4 hours PRN Severe Pain (7 - 10)  oxyCODONE    IR 5 milliGRAM(s) Oral every 4 hours PRN Moderate Pain (4 - 6)      Antibiotics History  ceFAZolin   IVPB 2000 milliGRAM(s) IV Intermittent every 8 hours, 05-09-22 @ 19:43, Stop order after: 2 Doses      Heme Medications   enoxaparin Injectable 40 milliGRAM(s) SubCutaneous every 24 hours, 05-09-22 @ 19:42      GI Medications  polyethylene glycol 3350 17 Gram(s) Oral daily, 05-12-22 @ 12:46, Routine  senna 2 Tablet(s) Oral at bedtime, 05-09-22 @ 19:43, Routine      COVID  10-05-20 @ 00:58  COVID -   [facility]       WBC Trend  05-11-22 @ 06:20   -  9.13  05-10-22 @ 06:19   -  14.31<H>    H/H Trend  05-11-22 @ 06:20   -   11.3<L>/ 35.6  05-10-22 @ 06:19   -   13.3/ 39.6  05-03-22 @ 11:05   -   14.6/ 42.4    Stool Occult Blood    Platelet Trend  05-11-22 @ 06:20   -  138<L>  05-10-22 @ 06:19   -  187    Trend Sodium  05-11-22 @ 06:20   -  137  05-10-22 @ 06:19   -  138    Trend Potassium  05-11-22 @ 06:20   -  4.3  05-10-22 @ 06:19   -  4.6    Trend Bun/Cr  05-11-22 @ 06:20  BUN/CR -  20 / 0.88  05-10-22 @ 06:19  BUN/CR -  14 / 0.92    Lactic Acid Trend    ABG Trend    Trend AST/ALT/ALK Phos/Bili  05-10-22 @ 06:19   57<H>/37/104/1.0  10-05-20 @ 00:58   29/43/129<H>/0.5         Albumin Trend  05-10-22 @ 06:19   -   3.0<L>  10-05-20 @ 00:58   -   3.6      PTT - PT - INR Trend    Glucose Trend  05-11-22 @ 06:20   -  -- 81 --  05-10-22 @ 06:19   -  -- 139<H> --        LABS:                        11.3   9.13  )-----------( 138      ( 11 May 2022 06:20 )             35.6     05-11    137  |  107  |  20  ----------------------------<  81  4.3   |  24  |  0.88    Ca    9.3      11 May 2022 06:20               VITALS:  T(C): 36.4 (05-12-22 @ 13:05), Max: 37.5 (05-11-22 @ 23:29)  T(F): 97.6 (05-12-22 @ 13:05), Max: 99.5 (05-11-22 @ 23:29)  HR: 68 (05-12-22 @ 13:05) (65 - 80)  BP: 114/64 (05-12-22 @ 13:05) (101/59 - 114/64)  BP(mean): 73 (05-12-22 @ 05:00) (73 - 73)  ABP: --  ABP(mean): --  RR: 12 (05-12-22 @ 13:05) (12 - 18)  SpO2: 100% (05-12-22 @ 13:05) (92% - 100%)  CVP(mm Hg): --  CVP(cm H2O): --    Ins and Outs     05-11-22 @ 07:01  -  05-12-22 @ 07:00  --------------------------------------------------------  IN: 0 mL / OUT: 308.5 mL / NET: -308.5 mL    05-12-22 @ 07:01  -  05-12-22 @ 14:17  --------------------------------------------------------  IN: 0 mL / OUT: 82 mL / NET: -82 mL        Height (cm): 157.5 (05-10-22 @ 09:48)  Weight (kg): 85.6 (05-10-22 @ 09:48)  BMI (kg/m2): 34.5 (05-10-22 @ 09:48)        I&O's Detail    11 May 2022 07:01  -  12 May 2022 07:00  --------------------------------------------------------  IN:  Total IN: 0 mL    OUT:    Bulb (mL): 32.5 mL    Bulb (mL): 34 mL    Bulb (mL): 2.5 mL    Bulb (mL): 117 mL    Bulb (mL): 7.5 mL    Bulb (mL): 114 mL    Voided (mL): 1 mL  Total OUT: 308.5 mL    Total NET: -308.5 mL      12 May 2022 07:01  -  12 May 2022 14:17  --------------------------------------------------------  IN:  Total IN: 0 mL    OUT:    Bulb (mL): 1 mL    Bulb (mL): 1 mL    Bulb (mL): 25 mL    Bulb (mL): 15 mL    Bulb (mL): 30 mL    Bulb (mL): 10 mL  Total OUT: 82 mL    Total NET: -82 mL

## 2022-05-12 NOTE — PHYSICAL THERAPY INITIAL EVALUATION ADULT - LEVEL OF CONSCIOUSNESS, REHAB EVAL
----- Message from Richard Day sent at 7/20/2020  3:40 PM CDT -----  Regarding: Refill  Contact: BRIANNA KOCH [[id number]]      Can the clinic reply in [facility]: no      Please refill the medication(s) listed below. Please call the patient when the prescription(s) is ready for  at this phone number   [phone]      Medication #1 albuterol (VENTOLIN HFA) 90 mcg/actuation inhaler (states she needs 2)    Preferred Pharmacy: [facility] [place] [facility] [place]  [address] [place] [address]        lethargic/somnolent

## 2022-05-12 NOTE — PROGRESS NOTE ADULT - ASSESSMENT
Plan - POD#3    -Cont flap check  -OOB ambulate  -Encourage inc spirometer  -Cont Toradol IV  -D/c planning - probably home tomorrow

## 2022-05-13 ENCOUNTER — TRANSCRIPTION ENCOUNTER (OUTPATIENT)
Age: 57
End: 2022-05-13

## 2022-05-13 VITALS
DIASTOLIC BLOOD PRESSURE: 61 MMHG | TEMPERATURE: 98 F | RESPIRATION RATE: 15 BRPM | OXYGEN SATURATION: 97 % | HEART RATE: 75 BPM | SYSTOLIC BLOOD PRESSURE: 109 MMHG

## 2022-05-13 LAB — SURGICAL PATHOLOGY STUDY: SIGNIFICANT CHANGE UP

## 2022-05-13 RX ORDER — ASPIRIN/CALCIUM CARB/MAGNESIUM 324 MG
1 TABLET ORAL
Qty: 30 | Refills: 0
Start: 2022-05-13 | End: 2022-06-11

## 2022-05-13 RX ORDER — OXYCODONE HYDROCHLORIDE 5 MG/1
1 TABLET ORAL
Qty: 6 | Refills: 0
Start: 2022-05-13 | End: 2022-05-14

## 2022-05-13 RX ORDER — CEPHALEXIN 500 MG
1 CAPSULE ORAL
Qty: 28 | Refills: 0
Start: 2022-05-13 | End: 2022-05-26

## 2022-05-13 RX ADMIN — ENOXAPARIN SODIUM 40 MILLIGRAM(S): 100 INJECTION SUBCUTANEOUS at 05:36

## 2022-05-13 RX ADMIN — POLYETHYLENE GLYCOL 3350 17 GRAM(S): 17 POWDER, FOR SOLUTION ORAL at 11:22

## 2022-05-13 RX ADMIN — OXYCODONE HYDROCHLORIDE 10 MILLIGRAM(S): 5 TABLET ORAL at 01:06

## 2022-05-13 RX ADMIN — OXYCODONE HYDROCHLORIDE 5 MILLIGRAM(S): 5 TABLET ORAL at 15:51

## 2022-05-13 RX ADMIN — OXYCODONE HYDROCHLORIDE 5 MILLIGRAM(S): 5 TABLET ORAL at 04:39

## 2022-05-13 RX ADMIN — OXYCODONE HYDROCHLORIDE 5 MILLIGRAM(S): 5 TABLET ORAL at 09:02

## 2022-05-13 RX ADMIN — OXYCODONE HYDROCHLORIDE 5 MILLIGRAM(S): 5 TABLET ORAL at 05:33

## 2022-05-13 RX ADMIN — OXYCODONE HYDROCHLORIDE 10 MILLIGRAM(S): 5 TABLET ORAL at 02:26

## 2022-05-13 RX ADMIN — OXYCODONE HYDROCHLORIDE 5 MILLIGRAM(S): 5 TABLET ORAL at 10:00

## 2022-05-13 NOTE — DISCHARGE NOTE PROVIDER - NSDCCPCAREPLAN_GEN_ALL_CORE_FT
PRINCIPAL DISCHARGE DIAGNOSIS  Diagnosis: Malignant neoplasm of unspecified site of unspecified female breast  Assessment and Plan of Treatment:       SECONDARY DISCHARGE DIAGNOSES  Diagnosis: Breast CA  Assessment and Plan of Treatment:

## 2022-05-13 NOTE — DISCHARGE NOTE PROVIDER - NSDCCPTREATMENT_GEN_ALL_CORE_FT
PRINCIPAL PROCEDURE  Procedure: Mastectomy, bilateral, with sentinel lymph node biopsy  Findings and Treatment:       SECONDARY PROCEDURE  Procedure: Breast reconstruction with JIAN free flap  Findings and Treatment:

## 2022-05-13 NOTE — DISCHARGE NOTE PROVIDER - HOSPITAL COURSE
HPI:  55 y/o Qatari speaking female with PMH of vertigo presents for PST.  Patient reprots left breast clear nipple discharge resulting in work-up including mammogram and biopsy and dx with breast cancer.  Denies any recent cough fever or illness and is feeling well today at [facility].  Scheduled for bilateral simple mastectomy, left with sentinel node injection in OR, bilateral breast reconstruction with abdominal based flap (JIAN ) reconstruction with Dr Lezama on 05/09/2022.  COVID-19 pcr TBS- information provided   (03 May 2022 10:26)  Pt. tolerated procedure well. She is now POD #4  Jian flaps/ skin viable, pain controlled, tolerating diet, voiding, VNS arranged and patient awaiting discharge home today.

## 2022-05-13 NOTE — DISCHARGE NOTE PROVIDER - CARE PROVIDER_API CALL
Jenaro Montes)  ColonRectal Surgery; Plastic Surgery; Surgery; Surgery of the Hand  [address], [address]  [place] [address]  Phone: [phone]  Fax: [phone]  Follow Up Time: 1 week    Estee Wheatley)  Surgery  [address] [place] [address]  Phone: [phone]  Fax: [phone]  Follow Up Time: 2 weeks

## 2022-05-13 NOTE — PROGRESS NOTE ADULT - ASSESSMENT
Plan - POD#4  Doing well  Patient comfortable going home    -D/c home       -Percocet PRN      -Abx x 7 days      -Baby ASA daily x 1 month      -Tavo teaching      -Binder and bra      -Keep surgical site dry      -Follow up next week Monday  [phone]

## 2022-05-13 NOTE — PROGRESS NOTE ADULT - SUBJECTIVE AND OBJECTIVE BOX
No significant events  Pain under control  Ambulated    Vital Signs Last 24 Hrs  T(C): 37.1 (13 May 2022 08:26), Max: 37.1 (13 May 2022 08:26)  T(F): 98.7 (13 May 2022 08:26), Max: 98.7 (13 May 2022 08:26)  HR: 72 (13 May 2022 08:26) (63 - 72)  BP: 111/61 (13 May 2022 08:26) (102/52 - 123/72)  BP(mean): --  RR: 17 (13 May 2022 08:26) (12 - 19)  SpO2: 96% (13 May 2022 08:26) (96% - 100%)    Breast- flaps are warm, no congestion, vioptix stable, sarah serosang  Abd - incision cdi, sarah serosang

## 2022-05-13 NOTE — DISCHARGE NOTE PROVIDER - NSDCMRMEDTOKEN_GEN_ALL_CORE_FT
diclofenac: 75 milligram(s) orally once a day, As Needed  meclizine 12.5 mg oral tablet: 1 tab(s) orally 3 times a day, As Needed  Tylenol 500 mg oral tablet: 2 tab(s) orally every 6 hours, As Needed   aspirin 81 mg oral tablet, chewable: 1 tab(s) chewed once a day   cephalexin 500 mg oral tablet: 1 tab(s) orally 2 times a day   diclofenac: 75 milligram(s) orally once a day, As Needed  meclizine 12.5 mg oral tablet: 1 tab(s) orally 3 times a day, As Needed  oxyCODONE 5 mg oral tablet: 1 tab(s) orally every 8 hours, As Needed -for severe pain MDD:3   Tylenol 500 mg oral tablet: 2 tab(s) orally every 6 hours, As Needed

## 2022-05-13 NOTE — PROGRESS NOTE ADULT - PROVIDER SPECIALTY LIST ADULT
Critical Care
Plastic Surgery
Surgery
Critical Care
Critical Care
Plastic Surgery
Plastic Surgery
Critical Care
Plastic Surgery
Surgery

## 2022-05-13 NOTE — DISCHARGE NOTE PROVIDER - PROVIDER TOKENS
PROVIDER:[TOKEN:[[id number]],FOLLOWUP:[1 week]],PROVIDER:[TOKEN:[[id number]],FOLLOWUP:[2 weeks]]

## 2022-05-13 NOTE — DISCHARGE NOTE PROVIDER - INSTRUCTIONS
REG DIET NO CAFFEINE   DRINK PLENTY OF WATER  SPONGE BATH ONLY  SLEEP ON BACK ONLY  empty and record sarah drain output daily

## 2022-05-14 ENCOUNTER — NON-APPOINTMENT (OUTPATIENT)
Age: 57
End: 2022-05-14

## 2022-05-15 RX ORDER — OXYCODONE 5 MG/1
5 TABLET ORAL
Qty: 16 | Refills: 0 | Status: ACTIVE | COMMUNITY
Start: 2022-05-15 | End: 1900-01-01

## 2022-05-15 RX ORDER — OXYCODONE AND ACETAMINOPHEN 5; 325 MG/1; MG/1
5-325 TABLET ORAL
Qty: 12 | Refills: 0 | Status: DISCONTINUED | COMMUNITY
Start: 2022-05-15 | End: 2022-05-15

## 2022-05-16 ENCOUNTER — APPOINTMENT (OUTPATIENT)
Dept: PLASTIC SURGERY | Facility: CLINIC | Age: 57
End: 2022-05-16
Payer: MEDICAID

## 2022-05-16 VITALS
HEART RATE: 63 BPM | HEIGHT: 64 IN | TEMPERATURE: 97.6 F | WEIGHT: 198 LBS | BODY MASS INDEX: 33.8 KG/M2 | SYSTOLIC BLOOD PRESSURE: 164 MMHG | DIASTOLIC BLOOD PRESSURE: 110 MMHG | OXYGEN SATURATION: 98 %

## 2022-05-16 PROCEDURE — 99024 POSTOP FOLLOW-UP VISIT: CPT

## 2022-05-24 ENCOUNTER — APPOINTMENT (OUTPATIENT)
Dept: PLASTIC SURGERY | Facility: CLINIC | Age: 57
End: 2022-05-24
Payer: MEDICAID

## 2022-05-24 VITALS
HEART RATE: 69 BPM | OXYGEN SATURATION: 98 % | WEIGHT: 198 LBS | DIASTOLIC BLOOD PRESSURE: 119 MMHG | TEMPERATURE: 97.7 F | SYSTOLIC BLOOD PRESSURE: 175 MMHG | BODY MASS INDEX: 33.8 KG/M2 | HEIGHT: 64 IN

## 2022-05-24 PROCEDURE — 99024 POSTOP FOLLOW-UP VISIT: CPT

## 2022-05-24 RX ORDER — IBUPROFEN 800 MG/1
800 TABLET, FILM COATED ORAL 3 TIMES DAILY
Qty: 21 | Refills: 0 | Status: ACTIVE | COMMUNITY
Start: 2022-05-24 | End: 1900-01-01

## 2022-05-24 RX ORDER — OXYCODONE 5 MG/1
5 TABLET ORAL
Qty: 8 | Refills: 0 | Status: ACTIVE | COMMUNITY
Start: 2022-05-24 | End: 1900-01-01

## 2022-05-30 PROCEDURE — C1889: CPT

## 2022-05-30 PROCEDURE — 80048 BASIC METABOLIC PNL TOTAL CA: CPT

## 2022-05-30 PROCEDURE — C1781: CPT

## 2022-05-30 PROCEDURE — 80053 COMPREHEN METABOLIC PANEL: CPT

## 2022-05-30 PROCEDURE — 36415 COLL VENOUS BLD VENIPUNCTURE: CPT

## 2022-05-30 PROCEDURE — 85027 COMPLETE CBC AUTOMATED: CPT

## 2022-05-30 PROCEDURE — 85025 COMPLETE CBC W/AUTO DIFF WBC: CPT

## 2022-05-30 PROCEDURE — 88307 TISSUE EXAM BY PATHOLOGIST: CPT

## 2022-05-30 PROCEDURE — 88305 TISSUE EXAM BY PATHOLOGIST: CPT

## 2022-05-30 PROCEDURE — 97162 PT EVAL MOD COMPLEX 30 MIN: CPT

## 2022-05-30 PROCEDURE — A9541: CPT

## 2022-05-30 PROCEDURE — 88333 PATH CONSLTJ SURG CYTO XM 1: CPT

## 2022-05-30 PROCEDURE — 83735 ASSAY OF MAGNESIUM: CPT

## 2022-05-30 PROCEDURE — 84100 ASSAY OF PHOSPHORUS: CPT

## 2022-05-31 ENCOUNTER — APPOINTMENT (OUTPATIENT)
Dept: PLASTIC SURGERY | Facility: CLINIC | Age: 57
End: 2022-05-31
Payer: MEDICAID

## 2022-05-31 VITALS
OXYGEN SATURATION: 99 % | HEART RATE: 68 BPM | HEIGHT: 64 IN | BODY MASS INDEX: 33.8 KG/M2 | TEMPERATURE: 97.7 F | SYSTOLIC BLOOD PRESSURE: 120 MMHG | WEIGHT: 198 LBS | DIASTOLIC BLOOD PRESSURE: 79 MMHG

## 2022-05-31 PROCEDURE — 99024 POSTOP FOLLOW-UP VISIT: CPT

## 2022-05-31 RX ORDER — OXYCODONE 5 MG/1
5 TABLET ORAL
Qty: 6 | Refills: 0 | Status: ACTIVE | COMMUNITY
Start: 2022-05-31 | End: 1900-01-01

## 2022-06-06 ENCOUNTER — APPOINTMENT (OUTPATIENT)
Dept: PLASTIC SURGERY | Facility: CLINIC | Age: 57
End: 2022-06-06
Payer: MEDICAID

## 2022-06-06 VITALS
TEMPERATURE: 98 F | SYSTOLIC BLOOD PRESSURE: 131 MMHG | HEART RATE: 61 BPM | OXYGEN SATURATION: 99 % | BODY MASS INDEX: 33.8 KG/M2 | HEIGHT: 64 IN | DIASTOLIC BLOOD PRESSURE: 81 MMHG | WEIGHT: 198 LBS

## 2022-06-06 PROCEDURE — 99024 POSTOP FOLLOW-UP VISIT: CPT

## 2022-06-06 RX ORDER — POVIDONE IODINE 10% LIQUID 10 MG/ML
10 LIQUID TOPICAL
Qty: 1 | Refills: 0 | Status: ACTIVE | COMMUNITY
Start: 2022-06-06 | End: 1900-01-01

## 2022-06-09 ENCOUNTER — NON-APPOINTMENT (OUTPATIENT)
Age: 57
End: 2022-06-09

## 2022-06-23 ENCOUNTER — APPOINTMENT (OUTPATIENT)
Dept: PLASTIC SURGERY | Facility: CLINIC | Age: 57
End: 2022-06-23
Payer: MEDICAID

## 2022-06-23 VITALS
TEMPERATURE: 97.6 F | DIASTOLIC BLOOD PRESSURE: 83 MMHG | WEIGHT: 189 LBS | BODY MASS INDEX: 32.27 KG/M2 | SYSTOLIC BLOOD PRESSURE: 134 MMHG | HEART RATE: 73 BPM | OXYGEN SATURATION: 97 % | HEIGHT: 64 IN

## 2022-06-23 PROCEDURE — XXXXX: CPT | Mod: 1L

## 2022-06-30 ENCOUNTER — APPOINTMENT (OUTPATIENT)
Dept: PLASTIC SURGERY | Facility: CLINIC | Age: 57
End: 2022-06-30

## 2022-06-30 VITALS
TEMPERATURE: 97.6 F | WEIGHT: 190 LBS | SYSTOLIC BLOOD PRESSURE: 137 MMHG | HEIGHT: 64 IN | HEART RATE: 72 BPM | OXYGEN SATURATION: 99 % | DIASTOLIC BLOOD PRESSURE: 84 MMHG | BODY MASS INDEX: 32.44 KG/M2

## 2022-06-30 PROCEDURE — 99024 POSTOP FOLLOW-UP VISIT: CPT

## 2022-07-28 ENCOUNTER — APPOINTMENT (OUTPATIENT)
Dept: PLASTIC SURGERY | Facility: CLINIC | Age: 57
End: 2022-07-28
Payer: MEDICAID

## 2022-07-28 VITALS
SYSTOLIC BLOOD PRESSURE: 151 MMHG | BODY MASS INDEX: 32.44 KG/M2 | HEIGHT: 64 IN | OXYGEN SATURATION: 98 % | WEIGHT: 190 LBS | TEMPERATURE: 97.8 F | HEART RATE: 65 BPM | DIASTOLIC BLOOD PRESSURE: 89 MMHG

## 2022-07-28 PROCEDURE — XXXXX: CPT | Mod: 1L

## 2022-09-13 ENCOUNTER — APPOINTMENT (OUTPATIENT)
Dept: PLASTIC SURGERY | Facility: CLINIC | Age: 57
End: 2022-09-13

## 2022-09-29 ENCOUNTER — APPOINTMENT (OUTPATIENT)
Dept: PLASTIC SURGERY | Facility: CLINIC | Age: 57
End: 2022-09-29

## 2022-09-29 VITALS
OXYGEN SATURATION: 100 % | WEIGHT: 190 LBS | SYSTOLIC BLOOD PRESSURE: 160 MMHG | TEMPERATURE: 97.3 F | HEART RATE: 64 BPM | HEIGHT: 64 IN | BODY MASS INDEX: 32.44 KG/M2 | DIASTOLIC BLOOD PRESSURE: 90 MMHG

## 2022-09-29 PROCEDURE — 99213 OFFICE O/P EST LOW 20 MIN: CPT

## 2022-10-12 ENCOUNTER — TRANSCRIPTION ENCOUNTER (OUTPATIENT)
Age: 57
End: 2022-10-12

## 2022-10-12 ENCOUNTER — OUTPATIENT (OUTPATIENT)
Dept: OUTPATIENT SERVICES | Facility: HOSPITAL | Age: 57
LOS: 1 days | End: 2022-10-12

## 2022-10-12 VITALS
SYSTOLIC BLOOD PRESSURE: 150 MMHG | RESPIRATION RATE: 16 BRPM | TEMPERATURE: 97 F | OXYGEN SATURATION: 100 % | WEIGHT: 188.94 LBS | HEART RATE: 65 BPM | HEIGHT: 62 IN | DIASTOLIC BLOOD PRESSURE: 97 MMHG

## 2022-10-12 DIAGNOSIS — C50.919 MALIGNANT NEOPLASM OF UNSPECIFIED SITE OF UNSPECIFIED FEMALE BREAST: ICD-10-CM

## 2022-10-12 DIAGNOSIS — Z98.51 TUBAL LIGATION STATUS: Chronic | ICD-10-CM

## 2022-10-12 DIAGNOSIS — I10 ESSENTIAL (PRIMARY) HYPERTENSION: ICD-10-CM

## 2022-10-12 DIAGNOSIS — C50.912 MALIGNANT NEOPLASM OF UNSPECIFIED SITE OF LEFT FEMALE BREAST: ICD-10-CM

## 2022-10-12 DIAGNOSIS — Z98.890 OTHER SPECIFIED POSTPROCEDURAL STATES: Chronic | ICD-10-CM

## 2022-10-12 DIAGNOSIS — C50.919 MALIGNANT NEOPLASM OF UNSPECIFIED SITE OF UNSPECIFIED FEMALE BREAST: Chronic | ICD-10-CM

## 2022-10-12 LAB
ANION GAP SERPL CALC-SCNC: 11 MMOL/L — SIGNIFICANT CHANGE UP (ref 7–14)
BUN SERPL-MCNC: 20 MG/DL — SIGNIFICANT CHANGE UP (ref 7–23)
CALCIUM SERPL-MCNC: 9.8 MG/DL — SIGNIFICANT CHANGE UP (ref 8.4–10.5)
CHLORIDE SERPL-SCNC: 106 MMOL/L — SIGNIFICANT CHANGE UP (ref 98–107)
CO2 SERPL-SCNC: 23 MMOL/L — SIGNIFICANT CHANGE UP (ref 22–31)
CREAT SERPL-MCNC: 1.02 MG/DL — SIGNIFICANT CHANGE UP (ref 0.5–1.3)
EGFR: 64 ML/MIN/1.73M2 — SIGNIFICANT CHANGE UP
GLUCOSE SERPL-MCNC: 91 MG/DL — SIGNIFICANT CHANGE UP (ref 70–99)
HCT VFR BLD CALC: 40.3 % — SIGNIFICANT CHANGE UP (ref 34.5–45)
HGB BLD-MCNC: 13.2 G/DL — SIGNIFICANT CHANGE UP (ref 11.5–15.5)
MCHC RBC-ENTMCNC: 30.8 PG — SIGNIFICANT CHANGE UP (ref 27–34)
MCHC RBC-ENTMCNC: 32.8 GM/DL — SIGNIFICANT CHANGE UP (ref 32–36)
MCV RBC AUTO: 94.2 FL — SIGNIFICANT CHANGE UP (ref 80–100)
NRBC # BLD: 0 /100 WBCS — SIGNIFICANT CHANGE UP (ref 0–0)
NRBC # FLD: 0 K/UL — SIGNIFICANT CHANGE UP (ref 0–0)
PLATELET # BLD AUTO: 243 K/UL — SIGNIFICANT CHANGE UP (ref 150–400)
POTASSIUM SERPL-MCNC: 4.4 MMOL/L — SIGNIFICANT CHANGE UP (ref 3.5–5.3)
POTASSIUM SERPL-SCNC: 4.4 MMOL/L — SIGNIFICANT CHANGE UP (ref 3.5–5.3)
RBC # BLD: 4.28 M/UL — SIGNIFICANT CHANGE UP (ref 3.8–5.2)
RBC # FLD: 12.8 % — SIGNIFICANT CHANGE UP (ref 10.3–14.5)
SODIUM SERPL-SCNC: 140 MMOL/L — SIGNIFICANT CHANGE UP (ref 135–145)
WBC # BLD: 6.27 K/UL — SIGNIFICANT CHANGE UP (ref 3.8–10.5)
WBC # FLD AUTO: 6.27 K/UL — SIGNIFICANT CHANGE UP (ref 3.8–10.5)

## 2022-10-12 PROCEDURE — 93010 ELECTROCARDIOGRAM REPORT: CPT

## 2022-10-12 RX ORDER — ACETAMINOPHEN 500 MG
2 TABLET ORAL
Qty: 0 | Refills: 0 | DISCHARGE

## 2022-10-12 RX ORDER — MECLIZINE HCL 12.5 MG
1 TABLET ORAL
Qty: 0 | Refills: 0 | DISCHARGE

## 2022-10-12 NOTE — H&P PST ADULT - NSICDXPASTSURGICALHX_GEN_ALL_CORE_FT
PAST SURGICAL HISTORY:  Breast cancer May  2022  b/l mastectomy with reconstruction with NO post op chemotherapy or RT    H/O colonoscopy     H/O tubal ligation 1990

## 2022-10-12 NOTE — H&P PST ADULT - PROBLEM SELECTOR PLAN 1
This is a 58 y/o female who is scheduled for revision of b/l reconstructed breasts, revision of abdominal scars possible liposuction with fat grafting to breasts on 10-24-22  * Ordered covid-19 testing; given phone number and places that do covid testing to be done 3-5 days before surgery  * Given preop and cleanser instructions with good teach back and patient verbalized understanding

## 2022-10-12 NOTE — H&P PST ADULT - NSANTHOSAYNRD_GEN_A_CORE
No. YESI screening performed.  STOP BANG Legend: 0-2 = LOW Risk; 3-4 = INTERMEDIATE Risk; 5-8 = HIGH Risk

## 2022-10-12 NOTE — H&P PST ADULT - SOURCE OF INFORMATION, PROFILE
pt cell [phone]; daughter, Pham cell [phone]/patient pt cell [phone]; Pham/patient  # [id number]; pt cell [phone]; Pham/patient

## 2022-10-12 NOTE — H&P PST ADULT - PROBLEM SELECTOR PLAN 2
Await medical evaluation with pcp due to elevated BP at PAST with no h/o hypertension--notified surgeon via email

## 2022-10-12 NOTE — H&P PST ADULT - REASON FOR ADMISSION
"they are refinishing the reconstruction of the breast," as per the patient "they are refinishing the reconstruction of the breasts," as per the patient

## 2022-10-12 NOTE — H&P PST ADULT - NSICDXPASTMEDICALHX_GEN_ALL_CORE_FT
PAST MEDICAL HISTORY:  History of 2019 novel coronavirus disease (COVID-19) 3/2021 - not hospitalized but was treated with Abx therapy    Language barrier native language Setswana; verbalizes and comprehends minimal English    Malignant neoplasm of unspecified site of left female breast diagnosed in Feb. 2022    Vertigo

## 2022-10-12 NOTE — H&P PST ADULT - HISTORY OF PRESENT ILLNESS
This is a 56 y/o female whose native language is Guamanian; verbalized and comprehends some English. Utilized  # [id number] for accurate information. Patient  presents with h/o left breast cancer diagnosed in Feb. 2022. Underwent b/l breast mastectomy with reconstruction in May 2022. Had NO post op RT or chemotherapy. Scheduled for revision of b/l reconstructed breasts, reviso of abdominal scars, possible liposuction with fat grafting to breasts This is a 56 y/o female whose native language is Citizen of [place]; verbalized and comprehends some English. Utilized  # [id number] for accurate information. Patient  presents with h/o left breast cancer diagnosed in Feb. 2022. Underwent b/l breast mastectomy with reconstruction in May 2022. Had NO post op RT or chemotherapy. Scheduled for revision of b/l reconstructed breasts, revison  of abdominal scars, possible liposuction with fat grafting to breasts

## 2022-10-27 ENCOUNTER — NON-APPOINTMENT (OUTPATIENT)
Age: 57
End: 2022-10-27

## 2022-10-28 VITALS
SYSTOLIC BLOOD PRESSURE: 176 MMHG | WEIGHT: 188.94 LBS | HEART RATE: 67 BPM | RESPIRATION RATE: 16 BRPM | DIASTOLIC BLOOD PRESSURE: 74 MMHG | OXYGEN SATURATION: 100 % | TEMPERATURE: 98 F | HEIGHT: 62 IN

## 2022-10-28 NOTE — ASU PREOPERATIVE ASSESSMENT, ADULT ([facility] ONLY) - FALL HARM RISK - UNIVERSAL INTERVENTIONS
Bed in lowest position, wheels locked, appropriate side rails in place/Call bell, personal items and telephone in reach/Instruct patient to call for assistance before getting out of bed or chair/Non-slip footwear when patient is out of bed/Melvin to call system/Physically safe environment - no spills, clutter or unnecessary equipment/Purposeful Proactive Rounding/Room/bathroom lighting operational, light cord in reach

## 2022-10-28 NOTE — ASU PREOPERATIVE ASSESSMENT, ADULT ([facility] ONLY) - PROCEDURE
Revision of B/L reconstructed breast, revision of abdominal scar, possible liposuction with fat grafting to breasts

## 2022-10-30 ENCOUNTER — TRANSCRIPTION ENCOUNTER (OUTPATIENT)
Age: 57
End: 2022-10-30

## 2022-10-31 ENCOUNTER — OUTPATIENT (OUTPATIENT)
Dept: OUTPATIENT SERVICES | Facility: HOSPITAL | Age: 57
LOS: 1 days | Discharge: HOME | End: 2022-10-31

## 2022-10-31 ENCOUNTER — APPOINTMENT (OUTPATIENT)
Dept: PLASTIC SURGERY | Facility: HOSPITAL | Age: 57
End: 2022-10-31

## 2022-10-31 ENCOUNTER — TRANSCRIPTION ENCOUNTER (OUTPATIENT)
Age: 57
End: 2022-10-31

## 2022-10-31 VITALS
DIASTOLIC BLOOD PRESSURE: 72 MMHG | SYSTOLIC BLOOD PRESSURE: 126 MMHG | HEART RATE: 72 BPM | RESPIRATION RATE: 20 BRPM | OXYGEN SATURATION: 99 %

## 2022-10-31 DIAGNOSIS — C50.919 MALIGNANT NEOPLASM OF UNSPECIFIED SITE OF UNSPECIFIED FEMALE BREAST: Chronic | ICD-10-CM

## 2022-10-31 DIAGNOSIS — Z98.51 TUBAL LIGATION STATUS: Chronic | ICD-10-CM

## 2022-10-31 DIAGNOSIS — Z98.890 OTHER SPECIFIED POSTPROCEDURAL STATES: Chronic | ICD-10-CM

## 2022-10-31 DIAGNOSIS — C50.912 MALIGNANT NEOPLASM OF UNSPECIFIED SITE OF LEFT FEMALE BREAST: ICD-10-CM

## 2022-10-31 PROBLEM — R42 DIZZINESS AND GIDDINESS: Chronic | Status: ACTIVE | Noted: 2022-10-12

## 2022-10-31 PROBLEM — Z78.9 OTHER SPECIFIED HEALTH STATUS: Chronic | Status: ACTIVE | Noted: 2022-10-12

## 2022-10-31 PROCEDURE — 14302 TIS TRNFR ADDL 30 SQ CM: CPT

## 2022-10-31 PROCEDURE — 15771 GRFG AUTOL FAT LIPO 50 CC/<: CPT | Mod: 59

## 2022-10-31 PROCEDURE — 15772 GRFG AUTOL FAT LIPO EA ADDL: CPT

## 2022-10-31 PROCEDURE — 14301 TIS TRNFR ANY 30.1-60 SQ CM: CPT

## 2022-10-31 PROCEDURE — 19380 REVJ RECONSTRUCTED BREAST: CPT | Mod: 50

## 2022-10-31 DEVICE — ARISTA 3GR: Type: IMPLANTABLE DEVICE | Status: FUNCTIONAL

## 2022-10-31 RX ORDER — DICLOFENAC SODIUM 75 MG/1
75 TABLET, DELAYED RELEASE ORAL
Qty: 0 | Refills: 0 | DISCHARGE

## 2022-10-31 NOTE — ASU DISCHARGE PLAN (ADULT/PEDIATRIC) - NS MD DC FALL RISK RISK
For information on Fall & Injury Prevention, visit: [email or web address] OR  [email or web address] OR  https://www.cdc.gov/steadi/patient.html

## 2022-10-31 NOTE — ASU DISCHARGE PLAN (ADULT/PEDIATRIC) - PROCEDURE
Revision of Bilateral Reconstructed Breasts with Abdominal Scar Revision and Liposuction & Fat Grafting - by Dr. Montes ([phone]). Revision of Bilateral Reconstructed Breasts with Abdominal Scar Revision and Liposuction & Fat Grafting - by Dr. Montes ([phone]). Revision of Bilateral Reconstructed Breasts with Abdominal Scar Revision and Liposuction & Fat Grafting - by Dr. Montes ([phone]). POST OP APPT on 11/08/22 @ 11:30am.

## 2022-10-31 NOTE — ASU DISCHARGE PLAN (ADULT/PEDIATRIC) - NURSING INSTRUCTIONS
DO NOT take any Tylenol (Acetaminophen) or narcotics containing Tylenol until after 9pm . You received Tylenol during your operation and it can cause damage to your liver if too much is taken within a 24 hour time period.

## 2022-10-31 NOTE — ASU DISCHARGE PLAN (ADULT/PEDIATRIC) - ASU DC SPECIAL INSTRUCTIONSFT
1. Please follow up with Dr. Montes's PA, Loyda, next week TUESDAY for your first post operative visit. Your appointment has already been made. Please call the office if you need to make any changes to your appointment at [phone] (during normal business hours M-F 9-5pm).   -  -  2. Activity:   Please avoid any strenuous activities, AVOID bending, stretching, reaching overhead, or any heavy lifting.  -  -  3. Dressings:   Some OOZING and blood on the dressing is normal and to be expected. If it becomes saturated w/ BRIGHT red blood that does not stop, please call [phone] for email LOYDA: [email or web address]  -  -  4. Medications:  Your medications were sent to your pharmacy.  Please take the prescribed medications as directed.   For MILD pain please take regular over the counter pain medications such as: Advil, Tylenol, Motrin.   Only take the narcotic prescribed pain medication for SEVERE PAIN.   If constipation occurs after taking narcotic pain medications, please take an over the counter stool softener. 1. Please follow up with Dr. Montes's PA, Loyda, next week TUESDAY 11/ 08/ 22 @ 11:30am for your first post operative visit. Your appointment has already been made. Please call the office if you need to make any changes to your appointment at [phone] (during normal business hours M-F 9-5pm).   -  -  2. Activity:   Please avoid any strenuous activities, AVOID bending, stretching, reaching overhead, or any heavy lifting.  Please wear the bra & binder daily. Icing on top of the bra is recommended for the under arms and lateral chest.  -  -  3. Dressings: There will be oozing possibly on the sides of the breasts. This is normal due to the liposuction fluid.   Some OOZING and blood on the dressing is normal and to be expected. If it becomes saturated w/ BRIGHT red blood that does not stop, please call [phone] for email LOYDA: [email or web address]  -  -  4. Medications:  Your medications were sent to your pharmacy.  Please take the prescribed medications as directed.   For MILD pain please take regular over the counter pain medications such as: Advil, Tylenol, Motrin.   Only take the narcotic prescribed pain medication for SEVERE PAIN.   If constipation occurs after taking narcotic pain medications, please take an over the counter stool softener.

## 2022-10-31 NOTE — BRIEF OPERATIVE NOTE - OPERATION/FINDINGS
revision of bilateral JIAN flap skin paddles, liposuction of bilateral lateral breast and abdomen, bilateral abdominal scar revision, fat grafting to bilateral breasts

## 2022-10-31 NOTE — ASU DISCHARGE PLAN (ADULT/PEDIATRIC) - PAIN MANAGEMENT
Prescriptions electronically submitted to pharmacy from [facility] Percocet - Narcotic pain medication. Only take if needed./Prescriptions electronically submitted to pharmacy from [facility]

## 2022-10-31 NOTE — ASU DISCHARGE PLAN (ADULT/PEDIATRIC) - CARE PROVIDER_API CALL
Rigo De La Vega)  Physician Assistant Services  [address], [address]  [place] [address]  Phone: [phone]  Fax: [phone]  Scheduled Appointment: 11/08/2022   Admission

## 2022-10-31 NOTE — ASU DISCHARGE PLAN (ADULT/PEDIATRIC) - COMMENTS
Dr. Montes's  direct phone number is [phone]. If after office hours (9-5PM M-F), then please wait until normal business hours to call.   You may leave a detailed VM as well. You may also email [email or web address] for any concerns or questions regarding scheduling appointments.  You will see Dr. Montes's DAVIDE Scales, (Rigo AUGUSTINE) for your post operative visit.  You may also contact her directly via email: [email or web address] for any concerns or questions.

## 2022-10-31 NOTE — ASU DISCHARGE PLAN (ADULT/PEDIATRIC) - ACTIVITY LEVEL
Please do not sleep on your sides./No excercise/No heavy lifting/No sports/gym/No weight bearing/No intercourse

## 2022-10-31 NOTE — BRIEF OPERATIVE NOTE - NSICDXBRIEFPROCEDURE_GEN_ALL_CORE_FT
PROCEDURES:  Suction lipectomy of breast 31-Oct-2022 14:39:39  Delfino Dee  Suction lipectomy of abdomen 31-Oct-2022 14:39:49  Delfino Dee  Revision of reconstructed breast 31-Oct-2022 14:39:59  Delfino Dee  Fat graft, breast 31-Oct-2022 14:40:09  Delfino Dee

## 2022-11-08 ENCOUNTER — APPOINTMENT (OUTPATIENT)
Dept: PLASTIC SURGERY | Facility: CLINIC | Age: 57
End: 2022-11-08

## 2022-11-08 VITALS
BODY MASS INDEX: 32.44 KG/M2 | TEMPERATURE: 97.6 F | HEIGHT: 64 IN | SYSTOLIC BLOOD PRESSURE: 142 MMHG | WEIGHT: 190 LBS | OXYGEN SATURATION: 100 % | DIASTOLIC BLOOD PRESSURE: 87 MMHG | HEART RATE: 62 BPM

## 2022-11-08 PROCEDURE — 99024 POSTOP FOLLOW-UP VISIT: CPT

## 2022-11-17 ENCOUNTER — APPOINTMENT (OUTPATIENT)
Dept: PLASTIC SURGERY | Facility: CLINIC | Age: 57
End: 2022-11-17

## 2022-11-17 VITALS
SYSTOLIC BLOOD PRESSURE: 129 MMHG | HEIGHT: 64 IN | OXYGEN SATURATION: 99 % | BODY MASS INDEX: 32.44 KG/M2 | TEMPERATURE: 97.8 F | WEIGHT: 190 LBS | HEART RATE: 72 BPM | DIASTOLIC BLOOD PRESSURE: 82 MMHG

## 2022-11-17 PROCEDURE — 99024 POSTOP FOLLOW-UP VISIT: CPT

## 2022-12-08 ENCOUNTER — APPOINTMENT (OUTPATIENT)
Dept: PLASTIC SURGERY | Facility: CLINIC | Age: 57
End: 2022-12-08

## 2022-12-08 VITALS
BODY MASS INDEX: 32.44 KG/M2 | SYSTOLIC BLOOD PRESSURE: 152 MMHG | TEMPERATURE: 97.7 F | OXYGEN SATURATION: 100 % | WEIGHT: 190 LBS | HEIGHT: 64 IN | DIASTOLIC BLOOD PRESSURE: 88 MMHG | HEART RATE: 63 BPM

## 2022-12-08 DIAGNOSIS — C50.912 MALIGNANT NEOPLASM OF UNSPECIFIED SITE OF LEFT FEMALE BREAST: ICD-10-CM

## 2022-12-08 PROCEDURE — 99024 POSTOP FOLLOW-UP VISIT: CPT

## 2023-01-17 ENCOUNTER — APPOINTMENT (OUTPATIENT)
Dept: PLASTIC SURGERY | Facility: CLINIC | Age: 58
End: 2023-01-17
Payer: MEDICAID

## 2023-01-17 VITALS
OXYGEN SATURATION: 99 % | HEIGHT: 64 IN | DIASTOLIC BLOOD PRESSURE: 92 MMHG | SYSTOLIC BLOOD PRESSURE: 158 MMHG | HEART RATE: 66 BPM | TEMPERATURE: 97.7 F | WEIGHT: 190 LBS | BODY MASS INDEX: 32.44 KG/M2

## 2023-01-17 PROCEDURE — 99024 POSTOP FOLLOW-UP VISIT: CPT

## 2023-01-17 NOTE — REASON FOR VISIT
[Post Op: _________] : a [unfilled] post op visit [[employer] ] : provided by [employer]   [Interpreters_IDNumber] : [id number] [Interpreters_FullName] : Yaw

## 2023-03-02 ENCOUNTER — APPOINTMENT (OUTPATIENT)
Dept: PLASTIC SURGERY | Facility: CLINIC | Age: 58
End: 2023-03-02
Payer: MEDICAID

## 2023-03-02 VITALS
OXYGEN SATURATION: 97 % | SYSTOLIC BLOOD PRESSURE: 150 MMHG | HEART RATE: 61 BPM | TEMPERATURE: 97.8 F | BODY MASS INDEX: 32.44 KG/M2 | RESPIRATION RATE: 16 BRPM | HEIGHT: 64 IN | WEIGHT: 190 LBS | DIASTOLIC BLOOD PRESSURE: 90 MMHG

## 2023-03-02 PROCEDURE — 99213 OFFICE O/P EST LOW 20 MIN: CPT

## 2023-04-24 ENCOUNTER — APPOINTMENT (OUTPATIENT)
Dept: PLASTIC SURGERY | Facility: HOSPITAL | Age: 58
End: 2023-04-24

## 2023-04-24 ENCOUNTER — APPOINTMENT (OUTPATIENT)
Dept: PLASTIC SURGERY | Facility: CLINIC | Age: 58
End: 2023-04-24
Payer: MEDICAID

## 2023-04-24 VITALS
SYSTOLIC BLOOD PRESSURE: 166 MMHG | HEIGHT: 64 IN | HEART RATE: 62 BPM | TEMPERATURE: 97.7 F | BODY MASS INDEX: 32.61 KG/M2 | WEIGHT: 191 LBS | DIASTOLIC BLOOD PRESSURE: 93 MMHG | OXYGEN SATURATION: 100 %

## 2023-04-24 PROCEDURE — 19350 NIPPLE/AREOLA RECONSTRUCTION: CPT | Mod: RT

## 2023-05-02 ENCOUNTER — APPOINTMENT (OUTPATIENT)
Dept: PLASTIC SURGERY | Facility: CLINIC | Age: 58
End: 2023-05-02
Payer: MEDICAID

## 2023-05-02 VITALS
HEART RATE: 57 BPM | HEIGHT: 64 IN | SYSTOLIC BLOOD PRESSURE: 171 MMHG | WEIGHT: 191 LBS | DIASTOLIC BLOOD PRESSURE: 97 MMHG | TEMPERATURE: 98 F | OXYGEN SATURATION: 100 % | BODY MASS INDEX: 32.61 KG/M2

## 2023-05-02 PROCEDURE — 99024 POSTOP FOLLOW-UP VISIT: CPT

## 2023-05-06 NOTE — PROCEDURE
[Nl] : None [FreeTextEntry1] : Absence of nipples. s/p bilateral mastectomies [FreeTextEntry2] : Bilateral nipple reconstruction with CV flaps [FreeTextEntry6] : New nipple positions were marked with patient in front of mirror. After being satisfied with position of the new nipples, patient was positioned supine and prepped and draped in usual sterile fashion. A CV flap was then designed and executed on each breast mound in the areas marked. A 4-0 vicryl and 5-0 plain were used to elevate the nipple flap and secure it in place. The donor sites were closed with 4-0 monocryl. The flaps were pink with no congestion after inset. Patient tolerated well the procedure, there were no complications.\par  [FreeTextEntry7] : none

## 2023-05-06 NOTE — REASON FOR VISIT
[Procedure: _________] : a [unfilled] procedure visit [FreeTextEntry1] : here today for bilateral nipple reconstruction

## 2023-05-09 ENCOUNTER — APPOINTMENT (OUTPATIENT)
Dept: RHEUMATOLOGY | Facility: CLINIC | Age: 58
End: 2023-05-09
Payer: MEDICAID

## 2023-05-09 VITALS
SYSTOLIC BLOOD PRESSURE: 147 MMHG | OXYGEN SATURATION: 99 % | DIASTOLIC BLOOD PRESSURE: 92 MMHG | TEMPERATURE: 97.9 F | HEIGHT: 64 IN | RESPIRATION RATE: 16 BRPM | HEART RATE: 61 BPM | BODY MASS INDEX: 32.1 KG/M2 | WEIGHT: 188 LBS

## 2023-05-09 DIAGNOSIS — G89.29 CERVICALGIA: ICD-10-CM

## 2023-05-09 DIAGNOSIS — M25.50 PAIN IN UNSPECIFIED JOINT: ICD-10-CM

## 2023-05-09 DIAGNOSIS — M54.50 LOW BACK PAIN, UNSPECIFIED: ICD-10-CM

## 2023-05-09 DIAGNOSIS — M54.2 CERVICALGIA: ICD-10-CM

## 2023-05-09 DIAGNOSIS — I10 ESSENTIAL (PRIMARY) HYPERTENSION: ICD-10-CM

## 2023-05-09 PROCEDURE — 99204 OFFICE O/P NEW MOD 45 MIN: CPT

## 2023-05-09 NOTE — PHYSICAL EXAM
[General Appearance - Well Nourished] : well nourished [General Appearance - Well Developed] : well developed [Sclera] : the sclera and conjunctiva were normal [Auscultation Breath Sounds / Voice Sounds] : lungs were clear to auscultation bilaterally [Heart Sounds Gallop] : no gallops [Heart Sounds] : normal S1 and S2 [Murmurs] : no murmurs [Heart Sounds Pericardial Friction Rub] : no pericardial rub [Abdomen Soft] : soft [Abdomen Tenderness] : non-tender [Musculoskeletal - Swelling] : no joint swelling seen [] : no rash [Skin Lesions] : no skin lesions [Oriented To Time, Place, And Person] : oriented to person, place, and time [FreeTextEntry1] : obese no

## 2023-05-09 NOTE — HISTORY OF PRESENT ILLNESS
[FreeTextEntry1] : 57 y.o F here for initial visit \par \par Pt reports joint pain for over 1 year. Pt reports pain in L hand, elbows, shoulders, knees, cervical spine, thoracic spine. \par Worse in pm,am. Pt reports activities make it worse. \par No stiffness, or swelling. \par Pt was taking tylenol, diclofenac. Pt stop all oral medications and now takes diclofenac gel which helps. \par \par Has gotten therapy w lymphedema. Had hx of breast ca, in surgical remission. \par \par No fevers, h/a, rashes, hair loss, oral ulcers, epistaxis, sinusitis,  swollen glands, dry mouth,  CP, SOB, cough, vision changes, abdominal pain, GERD, n/v/d, blood in stool or urine, focal weakness, sensory loss,  Raynaud's,  weight loss. \par +dry eyes

## 2023-05-09 NOTE — ASSESSMENT
[FreeTextEntry1] : 56 y/o F w polyarthralgais\par =pain in L hand, elbows, shoulders, knees, cervical spine, thoracic spine\par =worse w activities\par =no stiffness, swelling\par =obesse\par \par Suspect pt has degenerative process and/or FM. Will work up. \par \par Plan\par -Labs w serologies, inflammatory markers\par -Xrays cervical/lumbar spine, hands/wrists, shoulders, knees\par RTO depending on above results

## 2023-05-16 ENCOUNTER — APPOINTMENT (OUTPATIENT)
Dept: PLASTIC SURGERY | Facility: CLINIC | Age: 58
End: 2023-05-16
Payer: MEDICAID

## 2023-05-16 VITALS
OXYGEN SATURATION: 99 % | HEART RATE: 64 BPM | WEIGHT: 188 LBS | SYSTOLIC BLOOD PRESSURE: 157 MMHG | HEIGHT: 64 IN | DIASTOLIC BLOOD PRESSURE: 91 MMHG | TEMPERATURE: 97.9 F | BODY MASS INDEX: 32.1 KG/M2

## 2023-05-16 DIAGNOSIS — Z90.13 ACQUIRED ABSENCE OF BILATERAL BREASTS AND NIPPLES: ICD-10-CM

## 2023-05-16 DIAGNOSIS — Z98.890 OTHER SPECIFIED POSTPROCEDURAL STATES: ICD-10-CM

## 2023-05-16 PROCEDURE — 99024 POSTOP FOLLOW-UP VISIT: CPT

## 2023-05-19 LAB
ALBUMIN SERPL ELPH-MCNC: 4.4 G/DL
ALP BLD-CCNC: 147 U/L
ALT SERPL-CCNC: 24 U/L
ANION GAP SERPL CALC-SCNC: 11 MMOL/L
AST SERPL-CCNC: 20 U/L
BASOPHILS # BLD AUTO: 0.03 K/UL
BASOPHILS NFR BLD AUTO: 0.6 %
BILIRUB SERPL-MCNC: 0.4 MG/DL
BUN SERPL-MCNC: 18 MG/DL
CALCIUM SERPL-MCNC: 9.7 MG/DL
CCP AB SER IA-ACNC: <8 UNITS
CHLORIDE SERPL-SCNC: 106 MMOL/L
CO2 SERPL-SCNC: 26 MMOL/L
CREAT SERPL-MCNC: 0.83 MG/DL
CRP SERPL-MCNC: 3 MG/L
EGFR: 82 ML/MIN/1.73M2
EOSINOPHIL # BLD AUTO: 0.16 K/UL
EOSINOPHIL NFR BLD AUTO: 3.4 %
ERYTHROCYTE [SEDIMENTATION RATE] IN BLOOD BY WESTERGREN METHOD: 8 MM/HR
G6PD SER-CCNC: 12.1 U/G HGB
GLUCOSE SERPL-MCNC: 65 MG/DL
HCT VFR BLD CALC: 41.4 %
HGB BLD-MCNC: 13.1 G/DL
IMM GRANULOCYTES NFR BLD AUTO: 0.2 %
LYMPHOCYTES # BLD AUTO: 1.47 K/UL
LYMPHOCYTES NFR BLD AUTO: 31.2 %
MAN DIFF?: NORMAL
MCHC RBC-ENTMCNC: 31.6 GM/DL
MCHC RBC-ENTMCNC: 31.7 PG
MCV RBC AUTO: 100.2 FL
MONOCYTES # BLD AUTO: 0.54 K/UL
MONOCYTES NFR BLD AUTO: 11.5 %
NEUTROPHILS # BLD AUTO: 2.5 K/UL
NEUTROPHILS NFR BLD AUTO: 53.1 %
PLATELET # BLD AUTO: 199 K/UL
POTASSIUM SERPL-SCNC: 4.4 MMOL/L
PROT SERPL-MCNC: 6.7 G/DL
RBC # BLD: 4.13 M/UL
RBC # FLD: 13.1 %
RF+CCP IGG SER-IMP: NEGATIVE
SODIUM SERPL-SCNC: 143 MMOL/L
WBC # FLD AUTO: 4.71 K/UL

## 2023-06-02 ENCOUNTER — NON-APPOINTMENT (OUTPATIENT)
Age: 58
End: 2023-06-02

## 2023-06-02 DIAGNOSIS — M79.7 FIBROMYALGIA: ICD-10-CM

## 2023-10-23 ENCOUNTER — APPOINTMENT (OUTPATIENT)
Dept: PLASTIC SURGERY | Facility: CLINIC | Age: 58
End: 2023-10-23
Payer: MEDICAID

## 2023-10-23 VITALS
WEIGHT: 199 LBS | OXYGEN SATURATION: 99 % | DIASTOLIC BLOOD PRESSURE: 86 MMHG | SYSTOLIC BLOOD PRESSURE: 132 MMHG | HEIGHT: 64 IN | HEART RATE: 62 BPM | TEMPERATURE: 98 F | BODY MASS INDEX: 33.97 KG/M2

## 2023-10-23 DIAGNOSIS — Z90.13 ACQUIRED ABSENCE OF BILATERAL BREASTS AND NIPPLES: ICD-10-CM

## 2023-10-23 PROCEDURE — 19350 NIPPLE/AREOLA RECONSTRUCTION: CPT

## 2023-11-20 ENCOUNTER — APPOINTMENT (OUTPATIENT)
Dept: PLASTIC SURGERY | Facility: CLINIC | Age: 58
End: 2023-11-20

## 2023-11-20 VITALS
BODY MASS INDEX: 32.44 KG/M2 | HEIGHT: 64 IN | WEIGHT: 190 LBS | TEMPERATURE: 97.6 F | DIASTOLIC BLOOD PRESSURE: 90 MMHG | SYSTOLIC BLOOD PRESSURE: 156 MMHG | OXYGEN SATURATION: 99 % | HEART RATE: 63 BPM

## 2023-11-20 DIAGNOSIS — Z98.890 OTHER SPECIFIED POSTPROCEDURAL STATES: ICD-10-CM

## 2023-11-20 DIAGNOSIS — Z85.3 PERSONAL HISTORY OF MALIGNANT NEOPLASM OF BREAST: ICD-10-CM

## 2023-11-20 PROCEDURE — 99024 POSTOP FOLLOW-UP VISIT: CPT

## 2023-11-22 PROBLEM — Z98.890 POST-OPERATIVE STATE: Status: ACTIVE | Noted: 2022-05-16

## 2023-11-22 PROBLEM — Z85.3 HISTORY OF BREAST CANCER: Status: ACTIVE | Noted: 2023-10-23

## 2025-01-14 NOTE — DISCHARGE NOTE NURSING/CASE MANAGEMENT/SOCIAL WORK - PATIENT PORTAL LINK FT
Patient is requesting if he could be put on a Rx weight loss injection for weight management such as Wegovy or Zepbound.  
Patient must have in person evaluation with me or Rox prior to starting on weight loss medication.  
Pt has been kaitlin cristobal/Rox INFANTE on Fri 01/17 at 10am  
You can access the FollowMyHealth Patient Portal offered by [facility] by registering at the following website: [email or web address]. By joining [employer]’s FollowMyHealth portal, you will also be able to view your health information using other applications (apps) compatible with our system.

## 2025-04-23 NOTE — REASON FOR VISIT
and Bia RN attempt to contact patient via phone regarding Post IR Bone Marrow Biopsy on 04/22/2025. Left message to please contact Radiology RN at [phone].    [Post Op: _________] : a [unfilled] post op visit [[employer] ] : provided by [employer]   [Interpreters_IDNumber] : [id number] [Interpreters_FullName] : Conrado [TWNoteComboBox1] : Vietnamese

## (undated) DEVICE — GLV 7 PROTEXIS

## (undated) DEVICE — DRAPE 3/4 SHEET W REINFORCEMENT 56X77"

## (undated) DEVICE — GLV 6.5 PROTEXIS

## (undated) DEVICE — ELCTR BIPOLAR CORD J&J 12FT DISP

## (undated) DEVICE — ELCTR BOVIE HANDPIECE PENCIL

## (undated) DEVICE — SYR LUER LOK 10CC

## (undated) DEVICE — GLV 7.5 PROTEXIS

## (undated) DEVICE — BLANKET WARMER FULL UNDERBODY

## (undated) DEVICE — GOWN TRIMAX LG

## (undated) DEVICE — BLADE SURGICAL #15 CARBON

## (undated) DEVICE — MEDICATION LABELS W MARKER

## (undated) DEVICE — CONTAINER SPECIMEN 100ML

## (undated) DEVICE — SUT SOFSILK 2-0 18" C-23

## (undated) DEVICE — SUT MONOCRYL 3-0 18" PS-2 UNDYED

## (undated) DEVICE — LAP PAD W RING 18 X 18"

## (undated) DEVICE — SENSOR VIOPTIX TISSUE OXIMETER DISP

## (undated) DEVICE — MERCIAN VISABILITY BACKROUND YELLOW

## (undated) DEVICE — GLV 7 PROTEXIS (WHITE)

## (undated) DEVICE — SPONGE LAP X-RAY DETECTABLE 18X18"

## (undated) DEVICE — BAG DECANTER IV STERILE

## (undated) DEVICE — DRSG STERISTRIPS 0.5 X 4"

## (undated) DEVICE — ELCTR GROUNDING PAD ADULT COVIDIEN

## (undated) DEVICE — STAPLER SKIN VISI-STAT 35 WIDE

## (undated) DEVICE — DRAIN PENROSE 1" X 18"

## (undated) DEVICE — DRAPE HALF SHEET 40X57"

## (undated) DEVICE — NDL NERVE STIM 22GA X 2IN 30 DEG

## (undated) DEVICE — SYR LUER LOK 50CC

## (undated) DEVICE — STOPCOCK 3 WAY

## (undated) DEVICE — SOL IRR POUR H2O 250ML

## (undated) DEVICE — ELCTR BOVIE TIP NEEDLE INSULATED 2.8" EDGE

## (undated) DEVICE — SUT DERMABOND 0.7ML

## (undated) DEVICE — POSITIONER STRAP ARMBOARD VELCRO TS-30

## (undated) DEVICE — DRAIN RESERVOIR FOR JACKSON PRATT 100CC CARDINAL

## (undated) DEVICE — NDL HYPO REGULAR BEVEL 30G X .5"

## (undated) DEVICE — DRSG KERLIX ROLL 4.5"

## (undated) DEVICE — GLV 8 PROTEXIS

## (undated) DEVICE — ELCTR PENCIL NEPTUNE SMOKE EVACUATION

## (undated) DEVICE — VENODYNE/SCD SLEEVE CALF MEDIUM

## (undated) DEVICE — DRAIN BLAKE 15FR ROUND

## (undated) DEVICE — DRSG TEGADERM 6"X8"

## (undated) DEVICE — SUT POLYSORB 2-0 30" GS-21 UNDYED

## (undated) DEVICE — DRAPE IOBAN 10X5"

## (undated) DEVICE — WARMING BLANKET LOWER ADULT

## (undated) DEVICE — PREP CHLORAPREP ORANGE 2PCT 26ML

## (undated) DEVICE — WRAP COMPRESSION CALF LG

## (undated) DEVICE — DRSG CURITY GAUZE SPONGE 4 X 4" 12-PLY

## (undated) DEVICE — SUT PLAIN GUT FAST ABSORBING 5-0 PC-1

## (undated) DEVICE — GLV 8.5 PROTEXIS

## (undated) DEVICE — SUT MONOCRYL 3-0 27" PS-2 UNDYED

## (undated) DEVICE — DRAPE CHEST BREAST 102 X121X78"

## (undated) DEVICE — MARKER SKIN MULTI TIP 6"

## (undated) DEVICE — FORCEP BIPOLAR NON STICK 4" W 12FT CORD DISP

## (undated) DEVICE — SOL IRR POUR NS 0.9% 500ML

## (undated) DEVICE — DRAPE FLUID WARMER 44X44"

## (undated) DEVICE — SUT ETHILON 9-0 5" BV100-4

## (undated) DEVICE — LONE STAR ELASTIC STAYS 12MM BLUNT

## (undated) DEVICE — SUT VICRYL 2-0 27" SH UNDYED

## (undated) DEVICE — SUT POLYSORB 0 36" GS-21 UNDYED

## (undated) DEVICE — BLADE SAFETY LOCK #10

## (undated) DEVICE — SUT VICRYL 3-0 27" PS-2 UNDYED

## (undated) DEVICE — SUT ETHIBOND 1 30" CT-1

## (undated) DEVICE — DRAPE TOWEL BLUE 17" X 24"

## (undated) DEVICE — BLANKET WARMER LOWER ADULT

## (undated) DEVICE — DRSG BIOPATCH DISK W CHG 1" W 7.0MM HOLE

## (undated) DEVICE — SYR LUER LOK 3CC

## (undated) DEVICE — SUT DERMABOND PRINEO 60CM

## (undated) DEVICE — NEEDLE COUNTER  FOAM AND MAGNET 40-70

## (undated) DEVICE — FOLEY TRAY 16FR 5CC LTX UMETER CLOSED

## (undated) DEVICE — DRSG MASTISOL

## (undated) DEVICE — PACK MINOR

## (undated) DEVICE — SUT PDS II 0 27" CT-2

## (undated) DEVICE — PACK BASIC DISP

## (undated) DEVICE — DRAPE 3/4 SHEET 52X76"

## (undated) DEVICE — BLADE SAFETY LOCK #15

## (undated) DEVICE — DRAPE INSTRUMENT POUCH